# Patient Record
Sex: MALE | Race: WHITE | Employment: OTHER | URBAN - METROPOLITAN AREA
[De-identification: names, ages, dates, MRNs, and addresses within clinical notes are randomized per-mention and may not be internally consistent; named-entity substitution may affect disease eponyms.]

---

## 2017-01-25 PROCEDURE — 82088 ASSAY OF ALDOSTERONE: CPT | Performed by: INTERNAL MEDICINE

## 2017-01-25 PROCEDURE — 84244 ASSAY OF RENIN: CPT | Performed by: INTERNAL MEDICINE

## 2017-01-25 PROCEDURE — 83735 ASSAY OF MAGNESIUM: CPT | Performed by: INTERNAL MEDICINE

## 2017-01-25 PROCEDURE — 82533 TOTAL CORTISOL: CPT | Performed by: INTERNAL MEDICINE

## 2017-01-25 PROCEDURE — 36415 COLL VENOUS BLD VENIPUNCTURE: CPT | Performed by: INTERNAL MEDICINE

## 2017-01-25 PROCEDURE — 80048 BASIC METABOLIC PNL TOTAL CA: CPT | Performed by: INTERNAL MEDICINE

## 2017-01-26 PROCEDURE — 80048 BASIC METABOLIC PNL TOTAL CA: CPT | Performed by: INTERNAL MEDICINE

## 2017-01-26 PROCEDURE — 36415 COLL VENOUS BLD VENIPUNCTURE: CPT | Performed by: INTERNAL MEDICINE

## 2017-01-27 PROCEDURE — 36415 COLL VENOUS BLD VENIPUNCTURE: CPT | Performed by: FAMILY MEDICINE

## 2017-01-27 PROCEDURE — 80048 BASIC METABOLIC PNL TOTAL CA: CPT | Performed by: FAMILY MEDICINE

## 2017-01-28 ENCOUNTER — LAB ENCOUNTER (OUTPATIENT)
Dept: LAB | Age: 72
End: 2017-01-28
Payer: MEDICARE

## 2017-01-28 DIAGNOSIS — E26.9 HYPERALDOSTERONISM, UNSPECIFIED (HCC): Primary | ICD-10-CM

## 2017-01-28 LAB
BUN BLD-MCNC: 18 MG/DL (ref 8–20)
CALCIUM BLD-MCNC: 8.6 MG/DL (ref 8.3–10.3)
CHLORIDE: 105 MMOL/L (ref 101–111)
CO2: 29 MMOL/L (ref 22–32)
CREAT BLD-MCNC: 0.9 MG/DL (ref 0.7–1.3)
GLUCOSE BLD-MCNC: 99 MG/DL (ref 70–99)
POTASSIUM SERPL-SCNC: 3.9 MMOL/L (ref 3.6–5.1)
SODIUM SERPL-SCNC: 140 MMOL/L (ref 136–144)

## 2017-01-28 PROCEDURE — 80048 BASIC METABOLIC PNL TOTAL CA: CPT

## 2017-01-29 ENCOUNTER — APPOINTMENT (OUTPATIENT)
Dept: LAB | Facility: HOSPITAL | Age: 72
End: 2017-01-29
Attending: INTERNAL MEDICINE
Payer: MEDICARE

## 2017-01-29 DIAGNOSIS — E26.9 HYPERALDOSTERONISM (HCC): ICD-10-CM

## 2017-01-29 LAB
CREAT UR-SCNC: 2.21 G/24 HR (ref 0.95–2.49)
SODIUM SERPL-SCNC: 259 MEQ/24HR (ref 40–220)
SPECIMEN VOL UR: 1600 ML
SPECIMEN VOL UR: 1600 ML

## 2017-01-29 PROCEDURE — 82570 ASSAY OF URINE CREATININE: CPT

## 2017-01-29 PROCEDURE — 84300 ASSAY OF URINE SODIUM: CPT

## 2017-01-29 PROCEDURE — 82088 ASSAY OF ALDOSTERONE: CPT

## 2017-02-01 LAB
ALDOSTERONE, URINE: 10.5 UG/D
CREATININE, URINE - PER 24H: 2016 MG/D
CREATININE, URINE - PER VOLUME: 126 MG/DL
HOURS COLLECTED: 24 HR
TOTAL VOLUME: 1600 ML

## 2017-03-22 PROBLEM — I26.99 OTHER PULMONARY EMBOLISM WITHOUT ACUTE COR PULMONALE, UNSPECIFIED CHRONICITY (HCC): Status: ACTIVE | Noted: 2017-03-22

## 2017-04-14 PROCEDURE — 82436 ASSAY OF URINE CHLORIDE: CPT | Performed by: INTERNAL MEDICINE

## 2017-04-14 PROCEDURE — 84133 ASSAY OF URINE POTASSIUM: CPT | Performed by: INTERNAL MEDICINE

## 2017-04-14 PROCEDURE — 83930 ASSAY OF BLOOD OSMOLALITY: CPT | Performed by: INTERNAL MEDICINE

## 2017-04-14 PROCEDURE — 83935 ASSAY OF URINE OSMOLALITY: CPT | Performed by: INTERNAL MEDICINE

## 2017-04-14 PROCEDURE — 84300 ASSAY OF URINE SODIUM: CPT | Performed by: INTERNAL MEDICINE

## 2017-04-14 PROCEDURE — 82570 ASSAY OF URINE CREATININE: CPT | Performed by: INTERNAL MEDICINE

## 2017-05-31 PROCEDURE — 81001 URINALYSIS AUTO W/SCOPE: CPT | Performed by: INTERNAL MEDICINE

## 2017-05-31 PROCEDURE — 87086 URINE CULTURE/COLONY COUNT: CPT | Performed by: INTERNAL MEDICINE

## 2017-10-23 PROBLEM — I25.10 CORONARY ARTERY DISEASE INVOLVING NATIVE CORONARY ARTERY OF NATIVE HEART WITHOUT ANGINA PECTORIS: Status: ACTIVE | Noted: 2017-10-23

## 2017-10-23 PROCEDURE — 86803 HEPATITIS C AB TEST: CPT | Performed by: INTERNAL MEDICINE

## 2017-11-25 ENCOUNTER — HOSPITAL ENCOUNTER (OUTPATIENT)
Age: 72
Discharge: HOME | End: 2017-11-25
Attending: FAMILY MEDICINE
Payer: MEDICARE

## 2017-11-25 VITALS
OXYGEN SATURATION: 97 % | DIASTOLIC BLOOD PRESSURE: 61 MMHG | HEART RATE: 63 BPM | SYSTOLIC BLOOD PRESSURE: 112 MMHG | TEMPERATURE: 98 F | RESPIRATION RATE: 16 BRPM

## 2017-11-25 DIAGNOSIS — R04.0 BLEEDING NOSE: Primary | ICD-10-CM

## 2017-11-25 DIAGNOSIS — T14.8XXA BLEEDING FROM WOUND: ICD-10-CM

## 2017-11-25 PROCEDURE — 99213 OFFICE O/P EST LOW 20 MIN: CPT

## 2017-11-25 PROCEDURE — 30901 CONTROL OF NOSEBLEED: CPT

## 2017-11-25 NOTE — ED INITIAL ASSESSMENT (HPI)
Pt stated his left nostril began bleeding last night aroubnd 9 pm, he put his cpap machine on and had no more bleeding. He shaved this am, and then after his shower notede both sides bleeding.   There is a small cut in the middle of his nose where it meets

## 2017-11-25 NOTE — ED NOTES
Tea bag unsuccessful, bleeding continues, Md attempted silver nitrate sy=tick without success,  Gel foam applied with mild pressure, after 15 minutes, no bleeding or oozing noted, and steri strip applied to keep in place.  MD in to re evaluate

## 2017-11-26 NOTE — ED PROVIDER NOTES
Patient Seen in: [facility]    History   Patient presents with:  Nose Bleed (nasopharyngeal)    Stated Complaint: NOSE BLEED    HPI    81year old male presents for nose bleed.  Patient states his left nostril began bleeding last removal   No date: TONSILLECTOMY    Family history reviewed and is not pertinent to presenting problem.     Smoking status: Former Smoker                                                              Packs/day: 0.00      Years: 10.00        Types: Cigars bleeding applying pressure. He was not therapeutic with INR. Tea bag was applied for 20 minutes with no success. Silver nitrate was applied but still not able to control bleeding. Then gel foam was applied and monitor for few minutes.  Bleeding was controll

## 2018-01-02 PROCEDURE — 36415 COLL VENOUS BLD VENIPUNCTURE: CPT | Performed by: OTHER

## 2018-01-02 PROCEDURE — 82607 VITAMIN B-12: CPT | Performed by: OTHER

## 2018-01-02 PROCEDURE — 82746 ASSAY OF FOLIC ACID SERUM: CPT | Performed by: OTHER

## 2018-01-12 PROBLEM — R29.6 REPEATED FALLS: Status: ACTIVE | Noted: 2018-01-12

## 2018-01-12 PROBLEM — R42 DIZZINESS: Status: ACTIVE | Noted: 2018-01-12

## 2018-01-19 PROBLEM — R60.0 LOCALIZED EDEMA: Status: ACTIVE | Noted: 2018-01-19

## 2018-06-27 ENCOUNTER — WALK IN (OUTPATIENT)
Dept: URGENT CARE | Age: 73
End: 2018-06-27

## 2018-06-27 VITALS
BODY MASS INDEX: 33.13 KG/M2 | RESPIRATION RATE: 18 BRPM | OXYGEN SATURATION: 98 % | TEMPERATURE: 98.6 F | SYSTOLIC BLOOD PRESSURE: 130 MMHG | HEART RATE: 72 BPM | DIASTOLIC BLOOD PRESSURE: 78 MMHG | WEIGHT: 258 LBS

## 2018-06-27 DIAGNOSIS — M62.838 MUSCLE SPASMS OF NECK: Primary | ICD-10-CM

## 2018-06-27 PROCEDURE — 99214 OFFICE O/P EST MOD 30 MIN: CPT | Performed by: FAMILY MEDICINE

## 2018-06-27 RX ORDER — CYCLOBENZAPRINE HCL 10 MG
10 TABLET ORAL NIGHTLY
Qty: 10 TABLET | Refills: 0 | Status: SHIPPED | OUTPATIENT
Start: 2018-06-27

## 2018-06-27 ASSESSMENT — ENCOUNTER SYMPTOMS
ANOREXIA: 0
NUMBNESS: 0
ABDOMINAL PAIN: 0
COUGH: 0
GASTROINTESTINAL NEGATIVE: 1
HEADACHES: 0
WEAKNESS: 0
VISUAL CHANGE: 0
CHILLS: 0
CHANGE IN BOWEL HABIT: 0
RESPIRATORY NEGATIVE: 1
CONSTITUTIONAL NEGATIVE: 1
FATIGUE: 0
VERTIGO: 0
SORE THROAT: 0
FEVER: 0
VOMITING: 0
NAUSEA: 0
DIAPHORESIS: 0

## 2018-08-17 ENCOUNTER — TELEPHONE (OUTPATIENT)
Dept: HEMATOLOGY/ONCOLOGY | Facility: HOSPITAL | Age: 73
End: 2018-08-17

## 2019-01-07 ENCOUNTER — APPOINTMENT (OUTPATIENT)
Dept: GENERAL RADIOLOGY | Facility: HOSPITAL | Age: 74
End: 2019-01-07
Attending: EMERGENCY MEDICINE
Payer: MEDICARE

## 2019-01-07 ENCOUNTER — HOSPITAL ENCOUNTER (OUTPATIENT)
Facility: HOSPITAL | Age: 74
Setting detail: OBSERVATION
LOS: 1 days | Discharge: HOME | End: 2019-01-08
Attending: EMERGENCY MEDICINE | Admitting: INTERNAL MEDICINE
Payer: MEDICARE

## 2019-01-07 DIAGNOSIS — R07.9 CHEST PAIN OF UNCERTAIN ETIOLOGY: Primary | ICD-10-CM

## 2019-01-07 LAB
ALBUMIN SERPL-MCNC: 3.8 G/DL (ref 3.1–4.5)
ALBUMIN/GLOB SERPL: 1 {RATIO} (ref 1–2)
ALP LIVER SERPL-CCNC: 61 U/L (ref 45–117)
ALT SERPL-CCNC: 26 U/L (ref 17–63)
ANION GAP SERPL CALC-SCNC: 5 MMOL/L (ref 0–18)
APTT PPP: 34.5 SECONDS (ref 26.1–34.6)
AST SERPL-CCNC: 20 U/L (ref 15–41)
BASOPHILS # BLD AUTO: 0.03 X10(3) UL (ref 0–0.1)
BASOPHILS NFR BLD AUTO: 0.5 %
BILIRUB SERPL-MCNC: 0.6 MG/DL (ref 0.1–2)
BUN BLD-MCNC: 15 MG/DL (ref 8–20)
BUN/CREAT SERPL: 16.1 (ref 10–20)
CALCIUM BLD-MCNC: 8.9 MG/DL (ref 8.3–10.3)
CHLORIDE SERPL-SCNC: 103 MMOL/L (ref 101–111)
CO2 SERPL-SCNC: 31 MMOL/L (ref 22–32)
CREAT BLD-MCNC: 0.93 MG/DL (ref 0.7–1.3)
EOSINOPHIL # BLD AUTO: 0.07 X10(3) UL (ref 0–0.3)
EOSINOPHIL NFR BLD AUTO: 1.2 %
ERYTHROCYTE [DISTWIDTH] IN BLOOD BY AUTOMATED COUNT: 14 % (ref 11.5–16)
GLOBULIN PLAS-MCNC: 3.8 G/DL (ref 2.8–4.4)
GLUCOSE BLD-MCNC: 93 MG/DL (ref 70–99)
HCT VFR BLD AUTO: 46.4 % (ref 37–53)
HGB BLD-MCNC: 15.4 G/DL (ref 13–17)
IMMATURE GRANULOCYTE COUNT: 0.02 X10(3) UL (ref 0–1)
IMMATURE GRANULOCYTE RATIO %: 0.3 %
INR BLD: 1.85 (ref 0.9–1.1)
LYMPHOCYTES # BLD AUTO: 1.12 X10(3) UL (ref 0.9–4)
LYMPHOCYTES NFR BLD AUTO: 19.5 %
M PROTEIN MFR SERPL ELPH: 7.6 G/DL (ref 6.4–8.2)
MCH RBC QN AUTO: 28.1 PG (ref 27–33.2)
MCHC RBC AUTO-ENTMCNC: 33.2 G/DL (ref 31–37)
MCV RBC AUTO: 84.5 FL (ref 80–99)
MONOCYTES # BLD AUTO: 0.65 X10(3) UL (ref 0.1–1)
MONOCYTES NFR BLD AUTO: 11.3 %
NEUTROPHIL ABS PRELIM: 3.86 X10 (3) UL (ref 1.3–6.7)
NEUTROPHILS # BLD AUTO: 3.86 X10(3) UL (ref 1.3–6.7)
NEUTROPHILS NFR BLD AUTO: 67.2 %
OSMOLALITY SERPL CALC.SUM OF ELEC: 289 MOSM/KG (ref 275–295)
PLATELET # BLD AUTO: 169 10(3)UL (ref 150–450)
POTASSIUM SERPL-SCNC: 3.7 MMOL/L (ref 3.6–5.1)
PSA SERPL DL<=0.01 NG/ML-MCNC: 22 SECONDS (ref 12.4–14.7)
RBC # BLD AUTO: 5.49 X10(6)UL (ref 3.8–5.8)
RED CELL DISTRIBUTION WIDTH-SD: 42.8 FL (ref 35.1–46.3)
SODIUM SERPL-SCNC: 139 MMOL/L (ref 136–144)
TROPONIN I SERPL-MCNC: <0.046 NG/ML (ref ?–0.05)
WBC # BLD AUTO: 5.8 X10(3) UL (ref 4–13)

## 2019-01-07 PROCEDURE — 84484 ASSAY OF TROPONIN QUANT: CPT | Performed by: INTERNAL MEDICINE

## 2019-01-07 PROCEDURE — 85610 PROTHROMBIN TIME: CPT | Performed by: EMERGENCY MEDICINE

## 2019-01-07 PROCEDURE — 71045 X-RAY EXAM CHEST 1 VIEW: CPT | Performed by: EMERGENCY MEDICINE

## 2019-01-07 PROCEDURE — 85025 COMPLETE CBC W/AUTO DIFF WBC: CPT | Performed by: EMERGENCY MEDICINE

## 2019-01-07 PROCEDURE — 99285 EMERGENCY DEPT VISIT HI MDM: CPT | Performed by: EMERGENCY MEDICINE

## 2019-01-07 PROCEDURE — 84484 ASSAY OF TROPONIN QUANT: CPT | Performed by: EMERGENCY MEDICINE

## 2019-01-07 PROCEDURE — 93005 ELECTROCARDIOGRAM TRACING: CPT

## 2019-01-07 PROCEDURE — 80053 COMPREHEN METABOLIC PANEL: CPT | Performed by: EMERGENCY MEDICINE

## 2019-01-07 PROCEDURE — 93010 ELECTROCARDIOGRAM REPORT: CPT | Performed by: EMERGENCY MEDICINE

## 2019-01-07 PROCEDURE — 85730 THROMBOPLASTIN TIME PARTIAL: CPT | Performed by: EMERGENCY MEDICINE

## 2019-01-07 PROCEDURE — 36415 COLL VENOUS BLD VENIPUNCTURE: CPT | Performed by: EMERGENCY MEDICINE

## 2019-01-07 RX ORDER — PRAVASTATIN SODIUM 20 MG
20 TABLET ORAL NIGHTLY
Status: DISCONTINUED | OUTPATIENT
Start: 2019-01-07 | End: 2019-01-08

## 2019-01-07 RX ORDER — HYDROCHLOROTHIAZIDE 25 MG/1
25 TABLET ORAL
Status: DISCONTINUED | OUTPATIENT
Start: 2019-01-08 | End: 2019-01-08

## 2019-01-07 RX ORDER — WARFARIN SODIUM 7.5 MG/1
7.5 TABLET ORAL
Status: DISCONTINUED | OUTPATIENT
Start: 2019-01-08 | End: 2019-01-08

## 2019-01-07 RX ORDER — FAMOTIDINE 20 MG/1
20 TABLET ORAL DAILY
Status: DISCONTINUED | OUTPATIENT
Start: 2019-01-08 | End: 2019-01-08

## 2019-01-07 RX ORDER — ALPRAZOLAM 0.25 MG/1
0.25 TABLET ORAL DAILY PRN
Status: DISCONTINUED | OUTPATIENT
Start: 2019-01-07 | End: 2019-01-08

## 2019-01-07 RX ORDER — METOPROLOL SUCCINATE 25 MG/1
25 TABLET, EXTENDED RELEASE ORAL
Status: DISCONTINUED | OUTPATIENT
Start: 2019-01-08 | End: 2019-01-08

## 2019-01-07 RX ORDER — MORPHINE SULFATE 4 MG/ML
4 INJECTION, SOLUTION INTRAMUSCULAR; INTRAVENOUS EVERY 2 HOUR PRN
Status: DISCONTINUED | OUTPATIENT
Start: 2019-01-07 | End: 2019-01-08

## 2019-01-07 RX ORDER — WARFARIN SODIUM 7.5 MG/1
7.5 TABLET ORAL NIGHTLY
Status: DISCONTINUED | OUTPATIENT
Start: 2019-01-07 | End: 2019-01-07 | Stop reason: DRUGHIGH

## 2019-01-07 RX ORDER — LEVOTHYROXINE SODIUM 0.1 MG/1
100 TABLET ORAL
Status: DISCONTINUED | OUTPATIENT
Start: 2019-01-08 | End: 2019-01-08

## 2019-01-07 RX ORDER — MORPHINE SULFATE 4 MG/ML
1 INJECTION, SOLUTION INTRAMUSCULAR; INTRAVENOUS EVERY 2 HOUR PRN
Status: DISCONTINUED | OUTPATIENT
Start: 2019-01-07 | End: 2019-01-08

## 2019-01-07 RX ORDER — NITROGLYCERIN 0.4 MG/1
0.4 TABLET SUBLINGUAL EVERY 5 MIN PRN
Status: DISCONTINUED | OUTPATIENT
Start: 2019-01-07 | End: 2019-01-08

## 2019-01-07 RX ORDER — MORPHINE SULFATE 4 MG/ML
2 INJECTION, SOLUTION INTRAMUSCULAR; INTRAVENOUS EVERY 2 HOUR PRN
Status: DISCONTINUED | OUTPATIENT
Start: 2019-01-07 | End: 2019-01-08

## 2019-01-07 RX ORDER — NITROGLYCERIN 0.4 MG/1
0.4 TABLET SUBLINGUAL ONCE
Status: COMPLETED | OUTPATIENT
Start: 2019-01-07 | End: 2019-01-07

## 2019-01-07 RX ORDER — WARFARIN SODIUM 5 MG/1
5 TABLET ORAL
Status: DISCONTINUED | OUTPATIENT
Start: 2019-01-07 | End: 2019-01-08

## 2019-01-08 ENCOUNTER — APPOINTMENT (OUTPATIENT)
Dept: INTERVENTIONAL RADIOLOGY/VASCULAR | Facility: HOSPITAL | Age: 74
End: 2019-01-08
Attending: INTERNAL MEDICINE
Payer: MEDICARE

## 2019-01-08 VITALS
DIASTOLIC BLOOD PRESSURE: 92 MMHG | WEIGHT: 254 LBS | BODY MASS INDEX: 32.6 KG/M2 | HEART RATE: 74 BPM | HEIGHT: 74 IN | SYSTOLIC BLOOD PRESSURE: 128 MMHG | TEMPERATURE: 98 F | OXYGEN SATURATION: 96 % | RESPIRATION RATE: 18 BRPM

## 2019-01-08 LAB
ANION GAP SERPL CALC-SCNC: 6 MMOL/L (ref 0–18)
ATRIAL RATE: 62 BPM
BUN BLD-MCNC: 15 MG/DL (ref 8–20)
BUN/CREAT SERPL: 17.2 (ref 10–20)
CALCIUM BLD-MCNC: 8.6 MG/DL (ref 8.3–10.3)
CHLORIDE SERPL-SCNC: 105 MMOL/L (ref 101–111)
CO2 SERPL-SCNC: 28 MMOL/L (ref 22–32)
CREAT BLD-MCNC: 0.87 MG/DL (ref 0.7–1.3)
ERYTHROCYTE [DISTWIDTH] IN BLOOD BY AUTOMATED COUNT: 14.2 % (ref 11.5–16)
GLUCOSE BLD-MCNC: 107 MG/DL (ref 70–99)
GLUCOSE BLD-MCNC: 95 MG/DL (ref 65–99)
HCT VFR BLD AUTO: 45.8 % (ref 37–53)
HGB BLD-MCNC: 14.9 G/DL (ref 13–17)
INR BLD: 1.86 (ref 0.9–1.1)
MCH RBC QN AUTO: 28.3 PG (ref 27–33.2)
MCHC RBC AUTO-ENTMCNC: 32.5 G/DL (ref 31–37)
MCV RBC AUTO: 87.1 FL (ref 80–99)
OSMOLALITY SERPL CALC.SUM OF ELEC: 289 MOSM/KG (ref 275–295)
P AXIS: 40 DEGREES
P-R INTERVAL: 234 MS
PLATELET # BLD AUTO: 148 10(3)UL (ref 150–450)
POTASSIUM SERPL-SCNC: 3.7 MMOL/L (ref 3.6–5.1)
PSA SERPL DL<=0.01 NG/ML-MCNC: 22.1 SECONDS (ref 12.4–14.7)
Q-T INTERVAL: 496 MS
QRS DURATION: 166 MS
QTC CALCULATION (BEZET): 503 MS
R AXIS: 80 DEGREES
RBC # BLD AUTO: 5.26 X10(6)UL (ref 3.8–5.8)
RED CELL DISTRIBUTION WIDTH-SD: 44.9 FL (ref 35.1–46.3)
SODIUM SERPL-SCNC: 139 MMOL/L (ref 136–144)
T AXIS: 65 DEGREES
VENTRICULAR RATE: 62 BPM
WBC # BLD AUTO: 5 X10(3) UL (ref 4–13)

## 2019-01-08 PROCEDURE — 99153 MOD SED SAME PHYS/QHP EA: CPT

## 2019-01-08 PROCEDURE — B2121ZZ FLUOROSCOPY OF SINGLE CORONARY ARTERY BYPASS GRAFT USING LOW OSMOLAR CONTRAST: ICD-10-PCS | Performed by: INTERNAL MEDICINE

## 2019-01-08 PROCEDURE — 85610 PROTHROMBIN TIME: CPT | Performed by: INTERNAL MEDICINE

## 2019-01-08 PROCEDURE — 82962 GLUCOSE BLOOD TEST: CPT

## 2019-01-08 PROCEDURE — B2181ZZ FLUOROSCOPY OF LEFT INTERNAL MAMMARY BYPASS GRAFT USING LOW OSMOLAR CONTRAST: ICD-10-PCS | Performed by: INTERNAL MEDICINE

## 2019-01-08 PROCEDURE — 99152 MOD SED SAME PHYS/QHP 5/>YRS: CPT

## 2019-01-08 PROCEDURE — 80048 BASIC METABOLIC PNL TOTAL CA: CPT | Performed by: INTERNAL MEDICINE

## 2019-01-08 PROCEDURE — 93455 CORONARY ART/GRFT ANGIO S&I: CPT

## 2019-01-08 PROCEDURE — 85027 COMPLETE CBC AUTOMATED: CPT | Performed by: INTERNAL MEDICINE

## 2019-01-08 RX ORDER — LIDOCAINE HYDROCHLORIDE 10 MG/ML
INJECTION, SOLUTION EPIDURAL; INFILTRATION; INTRACAUDAL; PERINEURAL
Status: COMPLETED
Start: 2019-01-08 | End: 2019-01-08

## 2019-01-08 RX ORDER — MIDAZOLAM HYDROCHLORIDE 1 MG/ML
INJECTION INTRAMUSCULAR; INTRAVENOUS
Status: COMPLETED
Start: 2019-01-08 | End: 2019-01-08

## 2019-01-08 RX ORDER — SODIUM CHLORIDE 9 MG/ML
INJECTION, SOLUTION INTRAVENOUS CONTINUOUS
Status: DISCONTINUED | OUTPATIENT
Start: 2019-01-08 | End: 2019-01-08

## 2019-01-08 RX ORDER — HEPARIN SODIUM 5000 [USP'U]/ML
INJECTION, SOLUTION INTRAVENOUS; SUBCUTANEOUS
Status: COMPLETED
Start: 2019-01-08 | End: 2019-01-08

## 2019-01-08 RX ORDER — ASPIRIN 81 MG/1
324 TABLET, CHEWABLE ORAL DAILY
Status: DISCONTINUED | OUTPATIENT
Start: 2019-01-08 | End: 2019-01-08

## 2019-01-08 RX ORDER — POTASSIUM CHLORIDE 20 MEQ/1
40 TABLET, EXTENDED RELEASE ORAL ONCE
Status: COMPLETED | OUTPATIENT
Start: 2019-01-08 | End: 2019-01-08

## 2019-01-08 RX ORDER — SODIUM CHLORIDE 9 MG/ML
INJECTION, SOLUTION INTRAVENOUS CONTINUOUS
Status: ACTIVE | OUTPATIENT
Start: 2019-01-08 | End: 2019-01-08

## 2019-01-08 NOTE — H&P
[facility] Hospitalist H&P       CC: Patient presents with:  Chest Pain Angina (cardiovascular)       PCP: Mike Dupree MD    History of Present Illness:  Pt is a 70y/o M with hx CAD s/p PCI and emergent CABG, HTN, HLD, a fib on coumadin, hypothyroidism who p Comment:\"Loss of psychomotor skills and vomiting\"     Home Medications:    Outpatient Medications Marked as Taking for the 1/7/19 encounter [facility] Encounter):  TWIN ASPIRIN  MG Oral Tab EC TAKE ONE TABLET BY MOUTH ONE TIME DAILY Disp: 90 tablet Rf Dementia Father         Parkinson's Disease   • Cancer Father         colon cancer diagnosed at 80   • Stroke Mother        Review of Systems  Comprehensive ROS reviewed and negative except for what's stated above.   Including negative for fevers, chills, a compared with ECG of 24-JAN-2016 04:14,  AR interval has increased      Radiology:   Xr Chest Ap Portable  (cpt=71045)    Result Date: 1/7/2019  PROCEDURE:  XR CHEST AP PORTABLE  (CPT=71045)  TECHNIQUE:  AP chest radiograph was obtained.   COMPARISON:  EDWA services that will reasonably be expected to span two midnight's based on the clinical documentation in H+P. Based on patients current state of illness, I anticipate that, after discharge, patient will require TBD.

## 2019-01-08 NOTE — CM/SW NOTE
COND 44: Patient failed Inpatient criteria. Second level of review completed and supports Observation. UR committee in agreement. Discussed with Dr Zulema De Dios approves.

## 2019-01-08 NOTE — ED INITIAL ASSESSMENT (HPI)
Patient presents with left sided chest pressure that started about 1hr PTA; hx of triple bipass 2006.

## 2019-01-08 NOTE — ED PROVIDER NOTES
Patient Seen in: [facility] Emergency Department    History   Patient presents with:  Chest Pain Angina (cardiovascular)    Stated Complaint:     LOKI    Pritesh Simms is a pleasant 81-year-old male presenting to emerge part for chest pain.   He states that     right index finger sepsis s/p wart removal    • TONSILLECTOMY             Social History    Tobacco Use      Smoking status: Former Smoker        Years: 10.00        Types: Cigars        Quit date: 1977        Years since quittin.5      S COMP METABOLIC PANEL (14) - Normal   TROPONIN I - Normal   PTT, ACTIVATED - Normal   CBC WITH DIFFERENTIAL WITH PLATELET    Narrative: The following orders were created for panel order CBC WITH DIFFERENTIAL WITH PLATELET.   Procedure

## 2019-01-08 NOTE — CONSULTS
[facility]  Report of Consultation    Maria Teresa Guzman Patient Status:  Observation    1945 MRN [id number]   [facility] 2NE-A Attending Dyan Noriega MD   Hosp Day # 0 PCP Adi Serra MD     Reason for Consultation:  C that retrograde filled the LAD up to the occlusion, but a LIMA was not utilized. Last noc sitting in bed reading when he dev left chest pain like \"a little anvil on my chest, reminiscent of my prior MI\". No SOB, diaphoresis, nausea.   Sx persisted, ca 2006    [place], [place].  LIMA to LAD, SVG to D1, SVG to D2   • CABG     • COLONOSCOPY  2015    Dr. Paresh Blue, at [facility], polyps otherwise negative.     • OTHER SURGICAL HISTORY  2009    right index finger sepsis s/p wart removal    • TONSILLEC (36.8 °C)] 97.6 °F (36.4 °C)  Pulse:  [56-72] 64  Resp:  [13-21] 18  BP: (119-158)/(70-98) 131/81    Temp (24hrs), Av °F (36.7 °C), Min:97.6 °F (36.4 °C), Max:98.3 °F (36.8 °C)        General: Alert and oriented in no apparent distress.   HEENT: No foca

## 2019-01-08 NOTE — PROCEDURES
[facility]  Angiogram Procedure Note    Brent Hamilton Location: Cath Lab    CSN [id number] MRN [id number]   Admission Date 1/7/2019 Procedure Date 1/8/2019   Attending Physician Jasmin Salinas MD Procedure Physician Cristiana Babin MD     Pre-P Distally, the LAD has competitive flow. There was appoximately 25 mm of mid % instent restenosis. The D2 had mild diffuse luminal irregularities.     Right Coronary Artery: Selective injection into the RCA using the JR4 catheter showed a dominant v

## 2019-01-08 NOTE — PLAN OF CARE
CARDIOVASCULAR - ADULT    • Maintains optimal cardiac output and hemodynamic stability Progressing    • Absence of cardiac arrhythmias or at baseline Progressing        AOx4, 2/10 pain from headache from sublingual nitro,pt states chest pressure much less

## 2019-01-09 NOTE — PLAN OF CARE
D: Patient received orders for discharge    A: Reviewed post cath instructions, handout given; reviewed discharge instructions, appointments to be made; reviewed discharge medications, no new prescriptions, patient given a copy of INR result for coumadin d

## 2019-04-27 ENCOUNTER — APPOINTMENT (OUTPATIENT)
Dept: CT IMAGING | Facility: HOSPITAL | Age: 74
End: 2019-04-27
Attending: EMERGENCY MEDICINE
Payer: MEDICARE

## 2019-04-27 ENCOUNTER — HOSPITAL ENCOUNTER (EMERGENCY)
Facility: HOSPITAL | Age: 74
Discharge: HOME | End: 2019-04-27
Attending: EMERGENCY MEDICINE
Payer: MEDICARE

## 2019-04-27 ENCOUNTER — APPOINTMENT (OUTPATIENT)
Dept: MRI IMAGING | Facility: HOSPITAL | Age: 74
End: 2019-04-27
Attending: EMERGENCY MEDICINE
Payer: MEDICARE

## 2019-04-27 VITALS
HEART RATE: 72 BPM | RESPIRATION RATE: 14 BRPM | WEIGHT: 250 LBS | OXYGEN SATURATION: 95 % | BODY MASS INDEX: 31.08 KG/M2 | SYSTOLIC BLOOD PRESSURE: 147 MMHG | DIASTOLIC BLOOD PRESSURE: 100 MMHG | TEMPERATURE: 98 F | HEIGHT: 75 IN

## 2019-04-27 DIAGNOSIS — H81.10 BENIGN PAROXYSMAL POSITIONAL VERTIGO, UNSPECIFIED LATERALITY: Primary | ICD-10-CM

## 2019-04-27 PROCEDURE — 36415 COLL VENOUS BLD VENIPUNCTURE: CPT

## 2019-04-27 PROCEDURE — 70553 MRI BRAIN STEM W/O & W/DYE: CPT | Performed by: EMERGENCY MEDICINE

## 2019-04-27 PROCEDURE — 70450 CT HEAD/BRAIN W/O DYE: CPT | Performed by: EMERGENCY MEDICINE

## 2019-04-27 PROCEDURE — 70546 MR ANGIOGRAPH HEAD W/O&W/DYE: CPT | Performed by: EMERGENCY MEDICINE

## 2019-04-27 PROCEDURE — 93010 ELECTROCARDIOGRAM REPORT: CPT

## 2019-04-27 PROCEDURE — 70549 MR ANGIOGRAPH NECK W/O&W/DYE: CPT | Performed by: EMERGENCY MEDICINE

## 2019-04-27 PROCEDURE — 93005 ELECTROCARDIOGRAM TRACING: CPT

## 2019-04-27 PROCEDURE — 85025 COMPLETE CBC W/AUTO DIFF WBC: CPT | Performed by: EMERGENCY MEDICINE

## 2019-04-27 PROCEDURE — 80053 COMPREHEN METABOLIC PANEL: CPT | Performed by: EMERGENCY MEDICINE

## 2019-04-27 PROCEDURE — 99285 EMERGENCY DEPT VISIT HI MDM: CPT

## 2019-04-27 PROCEDURE — 85610 PROTHROMBIN TIME: CPT | Performed by: EMERGENCY MEDICINE

## 2019-04-27 PROCEDURE — 85730 THROMBOPLASTIN TIME PARTIAL: CPT | Performed by: EMERGENCY MEDICINE

## 2019-04-27 PROCEDURE — A9575 INJ GADOTERATE MEGLUMI 0.1ML: HCPCS | Performed by: EMERGENCY MEDICINE

## 2019-04-27 RX ORDER — TRAMADOL HYDROCHLORIDE 50 MG/1
50 TABLET ORAL ONCE
Status: COMPLETED | OUTPATIENT
Start: 2019-04-27 | End: 2019-04-27

## 2019-04-27 RX ORDER — MECLIZINE HYDROCHLORIDE 25 MG/1
25 TABLET ORAL 3 TIMES DAILY PRN
Qty: 15 TABLET | Refills: 0 | Status: SHIPPED | OUTPATIENT
Start: 2019-04-27 | End: 2021-11-30

## 2019-04-27 RX ORDER — MECLIZINE HYDROCHLORIDE 25 MG/1
25 TABLET ORAL ONCE
Status: COMPLETED | OUTPATIENT
Start: 2019-04-27 | End: 2019-04-27

## 2019-04-27 NOTE — ED NOTES
Pt states feeling better with the dizziness, states still has dizziness with positional changes but is improved.

## 2019-04-27 NOTE — ED PROVIDER NOTES
Patient Seen in: [facility] Emergency Department    History   Patient presents with:  Dizziness (neurologic)    Stated Complaint: dizzy    HPI    The patient is a 81-year-old male who presents emergency room with a history of sudden onset of dizzine APPENDECTOMY     • BYPASS SURGERY  2006    [place], [place].  LIMA to LAD, SVG to D1, SVG to D2   • CABG     • COLONOSCOPY  2015    Dr. Elaina Campbell, at [facility], polyps otherwise negative.     • OTHER SURGICAL HISTORY  2009    right index finger sep to palpation appreciated anywhere throughout the abdomen. There is no guarding, no rebound, no mass, and no organomegaly appreciated. There is normoactive bowel sounds. There is no hernia. EXTREMITIES: There is no cyanosis, clubbing, or edema appreciated. acute ST elevation appreciated. Ct Brain Or Head (06829)    Result Date: 4/27/2019  CONCLUSION:  Mild diffuse cerebral and cerebellar atrophy. No acute intracranial hemorrhage, mass effect or midline shift.    Dictated by: Daphney Fong neuro interventionalists regarding the possible 2 mm aneurysm versus artifact as per radiologist.  Patient has no evidence of any acute stroke. Patient be discharged home at this time.     Patient had an IV line established and blood work drawn including a MD  [address]  [phone]    Call in 2 days  please call for follow up this week    Giancarlo Maynard MD  [address] [phone]    Call in 2 days  please call monday for follow up with

## 2019-04-28 NOTE — ED NOTES
PT return from MRI at this time, complaining of severe back spasms. Patient states his back \"locked up\" from laying on MRI table for too long. MD aware. No new orders at this time.

## 2019-04-28 NOTE — ED NOTES
Pt's wife to nurse's station questioning \"where are the pain meds? \" and stating \"he is in severe pain he can't take it anymore. \" MD to bedside.

## 2019-05-08 ENCOUNTER — OFFICE VISIT (OUTPATIENT)
Dept: SURGERY | Facility: CLINIC | Age: 74
End: 2019-05-08
Payer: MEDICARE

## 2019-05-08 VITALS
SYSTOLIC BLOOD PRESSURE: 136 MMHG | HEIGHT: 74 IN | BODY MASS INDEX: 32.08 KG/M2 | DIASTOLIC BLOOD PRESSURE: 80 MMHG | WEIGHT: 250 LBS | HEART RATE: 72 BPM

## 2019-05-08 DIAGNOSIS — I67.1 INTRACRANIAL ANEURYSM: Primary | ICD-10-CM

## 2019-05-08 PROCEDURE — 99203 OFFICE O/P NEW LOW 30 MIN: CPT | Performed by: RADIOLOGY

## 2019-05-08 NOTE — PROGRESS NOTES
New Pt is here for Aneurysm. MRI and CT of the brain obtained.      Review of Systems:    Hand Dominance: right  General: fatigue  Neuro: memory loss  Head: no symptoms reported  Musculoskeletal: no symptoms reported  Cardiovascular: no symptoms reported  G

## 2019-05-08 NOTE — PROGRESS NOTES
Referred for equivocal finding of tiny distal L PICA aneurysm on MRA performed after presenting with dizziness, vertigo, imbalance and jerky vision diagnosed as a viral labyrinthitis with unremarkable MR brain and full resolution of symptoms on meclezine a 180 tablet Rfl: 1   LEVOTHYROXINE SODIUM 100 MCG Oral Tab TAKE 1 TABLET BY MOUTH DAILY Disp: 90 tablet Rfl: 0   SM ASPIRIN  MG Oral Tab EC TAKE ONE TABLET BY MOUTH ONE TIME DAILY Disp: 90 tablet Rfl: 3   RANITIDINE  MG Oral Tab TAKE ONE TABLET Cigars        Quit date: 1977        Years since quittin.9      Smokeless tobacco: Never Used    Substance and Sexual Activity      Alcohol use: Yes        Alcohol/week: 0.6 - 1.8 oz        Types: 1 - 3 Glasses of wine per week      Drug use:  No

## 2019-05-14 ENCOUNTER — HOSPITAL ENCOUNTER (OUTPATIENT)
Dept: CT IMAGING | Facility: HOSPITAL | Age: 74
Discharge: HOME | End: 2019-05-14
Attending: RADIOLOGY
Payer: MEDICARE

## 2019-05-14 DIAGNOSIS — I67.1 INTRACRANIAL ANEURYSM: ICD-10-CM

## 2019-05-14 PROCEDURE — 70496 CT ANGIOGRAPHY HEAD: CPT | Performed by: RADIOLOGY

## 2019-05-14 NOTE — TREATMENT PLAN
I reviewed the cta HEAD FROM 5/14/19 - No change in tiny 1-2mm prominence og L PICA distal segment in 4th ventricle. Plan to repeat an MRA head in 3 months to monitor.

## 2019-05-23 ENCOUNTER — TELEPHONE (OUTPATIENT)
Dept: SURGERY | Facility: CLINIC | Age: 74
End: 2019-05-23

## 2019-05-23 NOTE — TELEPHONE ENCOUNTER
Per  protocol patient's need to follow up in the office to review test results and discuss treatment plan. Will have  reach out to patient to schedule a follow up appointment.

## 2019-05-28 ENCOUNTER — OFFICE VISIT (OUTPATIENT)
Dept: SURGERY | Facility: CLINIC | Age: 74
End: 2019-05-28
Payer: MEDICARE

## 2019-05-28 ENCOUNTER — TELEPHONE (OUTPATIENT)
Dept: SURGERY | Facility: CLINIC | Age: 74
End: 2019-05-28

## 2019-05-28 VITALS — DIASTOLIC BLOOD PRESSURE: 80 MMHG | SYSTOLIC BLOOD PRESSURE: 142 MMHG | HEART RATE: 72 BPM

## 2019-05-28 DIAGNOSIS — R93.89 ABNORMAL FINDING OF DIAGNOSTIC IMAGING: Primary | ICD-10-CM

## 2019-05-28 DIAGNOSIS — I67.1 INTRACRANIAL ANEURYSM: ICD-10-CM

## 2019-05-28 PROCEDURE — 99213 OFFICE O/P EST LOW 20 MIN: CPT | Performed by: RADIOLOGY

## 2019-05-28 NOTE — PROGRESS NOTES
Pt is here for follow up for CTA of the brain.      Review of Systems:    Hand Dominance: right  General: fatigue  Neuro: memory loss  Head: no symptoms reported  Musculoskeletal: no symptoms reported  Cardiovascular: no symptoms reported  Gastrointestinal:

## 2019-05-28 NOTE — TELEPHONE ENCOUNTER
Patient of Junious Moisés due for f/u MRA and clinic visit in 3 months (8/2019.)    Patient reminder message placed.

## 2019-05-28 NOTE — PROGRESS NOTES
Here to review CTA head done on 5/14/2019 compared to MRA head 4/27/2019. The <1mm possible aneurysm located on a L PICA loop in the 4th ventricle is even less evident on the CTA probably due to technique.  The patient has no headaches and his dizziness has

## 2019-06-18 ENCOUNTER — TELEPHONE (OUTPATIENT)
Dept: SCHEDULING | Age: 74
End: 2019-06-18

## 2019-07-15 ENCOUNTER — TELEPHONE (OUTPATIENT)
Dept: SURGERY | Facility: CLINIC | Age: 74
End: 2019-07-15

## 2019-07-15 NOTE — TELEPHONE ENCOUNTER
Patient currently scheduled for MRI 8/12/19. Patient will need to schedule f/u appointment with  to review test results.

## 2019-08-14 ENCOUNTER — OFFICE VISIT (OUTPATIENT)
Dept: SURGERY | Facility: CLINIC | Age: 74
End: 2019-08-14
Payer: MEDICARE

## 2019-08-14 VITALS — SYSTOLIC BLOOD PRESSURE: 112 MMHG | DIASTOLIC BLOOD PRESSURE: 68 MMHG | HEART RATE: 64 BPM | RESPIRATION RATE: 18 BRPM

## 2019-08-14 DIAGNOSIS — I67.1 INTRACRANIAL ANEURYSM: Primary | ICD-10-CM

## 2019-08-14 PROCEDURE — 99213 OFFICE O/P EST LOW 20 MIN: CPT | Performed by: PHYSICIAN ASSISTANT

## 2019-08-14 NOTE — PROGRESS NOTES
Patient here to review MRI.     Review of Systems:    Hand Dominance: right  General: no symptoms reported  Neuro: memory loss  Head: no symptoms reported  Musculoskeletal: no symptoms reported  Cardiovascular: no symptoms reported  Gastrointestinal: no sym

## 2019-08-14 NOTE — PROGRESS NOTES
[address] Patient Status:  No patient class for patient encounter    1945 MRN [id number]   Location ED [facility], [address], [address] Attending No att. providers found   [address] Day # 0 PCP Madan Estes 1990s   • APPENDECTOMY     • BYPASS SURGERY  2006    [place], [place].  LIMA to LAD, SVG to D1, SVG to D2   • CABG     • COLONOSCOPY  2015    Dr. Pierre Garibay, at [facility], polyps otherwise negative.     • OTHER SURGICAL HISTORY  2009    right index tablet, Rfl: 3  •  RANITIDINE  MG Oral Tab, TAKE ONE TABLET BY MOUTH ONE TIME DAILY, Disp: 90 tablet, Rfl: 3  •  HYDROCHLOROTHIAZIDE 25 MG Oral Tab, TAKE ONE TABLET BY MOUTH ONE TIME DAILY, Disp: 90 tablet, Rfl: 3  •  Sertraline HCl 100 MG Oral Tab, reviewed, per Dr. Ren Persons \"equivocal 1-2mm aneurysmal bulge L PICA in 4th ventricle. If a true aneurysm then probably has a minimal risk over patient's lifetime but would nee to follow with non invasive surveillance. \"  Plan for repeat MRA in 9 months foll

## 2019-08-14 NOTE — PATIENT INSTRUCTIONS
Refill policies:    • Allow 2-3 business days for refills; controlled substances may take longer.   • Contact your pharmacy at least 5 days prior to running out of medication and have them send an electronic request or submit request through the “request re Depending on your insurance carrier, approval may take 3-10 days. It is highly recommended patients contact their insurance carrier directly to determine coverage.   If test is done without insurance authorization, patient may be responsible for the entire sudden/severe headache with nausea/vomiting.

## 2020-05-19 ENCOUNTER — HOSPITAL ENCOUNTER (EMERGENCY)
Age: 75
Discharge: HOME | End: 2020-05-19
Attending: EMERGENCY MEDICINE
Payer: MEDICARE

## 2020-05-19 VITALS
SYSTOLIC BLOOD PRESSURE: 142 MMHG | HEART RATE: 68 BPM | OXYGEN SATURATION: 97 % | BODY MASS INDEX: 23 KG/M2 | TEMPERATURE: 98 F | HEIGHT: 75 IN | DIASTOLIC BLOOD PRESSURE: 90 MMHG | WEIGHT: 185 LBS | RESPIRATION RATE: 16 BRPM

## 2020-05-19 DIAGNOSIS — S61.412A SUPERFICIAL LACERATION OF LEFT HAND, INITIAL ENCOUNTER: Primary | ICD-10-CM

## 2020-05-19 PROCEDURE — 99283 EMERGENCY DEPT VISIT LOW MDM: CPT

## 2020-05-19 PROCEDURE — 36415 COLL VENOUS BLD VENIPUNCTURE: CPT

## 2020-05-19 PROCEDURE — 12001 RPR S/N/AX/GEN/TRNK 2.5CM/<: CPT

## 2020-05-19 PROCEDURE — 85610 PROTHROMBIN TIME: CPT | Performed by: EMERGENCY MEDICINE

## 2020-05-19 NOTE — ED INITIAL ASSESSMENT (HPI)
Pt was playing ball with her daughter and his nail cut his finger left 5th digit, unable to control bleeding.  On coumadin

## 2020-07-16 PROBLEM — N40.1 BPH WITH OBSTRUCTION/LOWER URINARY TRACT SYMPTOMS: Status: ACTIVE | Noted: 2020-07-16

## 2020-07-16 PROBLEM — N13.8 BPH WITH OBSTRUCTION/LOWER URINARY TRACT SYMPTOMS: Status: ACTIVE | Noted: 2020-07-16

## 2020-07-16 PROBLEM — R31.0 GROSS HEMATURIA: Status: ACTIVE | Noted: 2020-07-16

## 2020-08-10 ENCOUNTER — HOSPITAL ENCOUNTER (OUTPATIENT)
Dept: MRI IMAGING | Facility: HOSPITAL | Age: 75
Discharge: HOME | End: 2020-08-10
Attending: PHYSICIAN ASSISTANT
Payer: MEDICARE

## 2020-08-10 DIAGNOSIS — I67.1 INTRACRANIAL ANEURYSM: ICD-10-CM

## 2020-08-10 PROCEDURE — 70544 MR ANGIOGRAPHY HEAD W/O DYE: CPT | Performed by: PHYSICIAN ASSISTANT

## 2020-10-12 ENCOUNTER — TELEPHONE (OUTPATIENT)
Dept: SCHEDULING | Age: 75
End: 2020-10-12

## 2020-10-13 PROCEDURE — 88108 CYTOPATH CONCENTRATE TECH: CPT | Performed by: UROLOGY

## 2020-10-15 ENCOUNTER — OFFICE VISIT (OUTPATIENT)
Dept: SURGERY | Facility: CLINIC | Age: 75
End: 2020-10-15
Payer: MEDICARE

## 2020-10-15 VITALS — SYSTOLIC BLOOD PRESSURE: 128 MMHG | DIASTOLIC BLOOD PRESSURE: 79 MMHG | HEART RATE: 69 BPM | TEMPERATURE: 97 F

## 2020-10-15 DIAGNOSIS — E03.9 ACQUIRED HYPOTHYROIDISM: ICD-10-CM

## 2020-10-15 DIAGNOSIS — Z80.0 FH: COLON CANCER: Primary | ICD-10-CM

## 2020-10-15 DIAGNOSIS — Z01.818 PRE-OP TESTING: ICD-10-CM

## 2020-10-15 DIAGNOSIS — Z95.1 H/O HEART BYPASS SURGERY: ICD-10-CM

## 2020-10-15 DIAGNOSIS — I10 ESSENTIAL HYPERTENSION: ICD-10-CM

## 2020-10-15 DIAGNOSIS — G47.33 OSA (OBSTRUCTIVE SLEEP APNEA): ICD-10-CM

## 2020-10-15 DIAGNOSIS — I25.10 CORONARY ARTERY DISEASE INVOLVING NATIVE CORONARY ARTERY OF NATIVE HEART WITHOUT ANGINA PECTORIS: ICD-10-CM

## 2020-10-15 PROCEDURE — 99203 OFFICE O/P NEW LOW 30 MIN: CPT | Performed by: COLON & RECTAL SURGERY

## 2020-10-15 RX ORDER — POLYETHYLENE GLYCOL 3350, SODIUM CHLORIDE, SODIUM BICARBONATE, POTASSIUM CHLORIDE 420; 11.2; 5.72; 1.48 G/4L; G/4L; G/4L; G/4L
POWDER, FOR SOLUTION ORAL
Qty: 1 BOTTLE | Refills: 0 | Status: SHIPPED | OUTPATIENT
Start: 2020-10-15 | End: 2020-11-04

## 2020-10-15 NOTE — H&P
New Patient Visit Note       Active Problems      1. FH: colon cancer    2. H/O heart bypass surgery    3. JOSÉ MANUEL (obstructive sleep apnea)    4. Coronary artery disease involving native coronary artery of native heart without angina pectoris    5.  Essential the PA  I performed my own physical exam and obtained the history as detailed above.   I have made all appropriate changes in documentation as necessary  I attest to the accuracy of this note as detailed above    MD THAD Taveras 70    My total The family history and social history have been reviewed by me today.     Family History   Problem Relation Age of Onset   • Heart Disease Father         late 62s   • Heart Disorder Father    • Dementia Father         Parkinson's Disease   • Cancer Fa Disp: 180 tablet, Rfl: 3  •  KETOCONAZOLE 2 % External Shampoo, Lather into scalp, let sit for 5 minutes then rinse. Use every other day., Disp: 120 mL, Rfl: 0  •  finasteride 5 MG Oral Tab, Take 1 tablet (5 mg total) by mouth daily. , Disp: 90 tablet, Rfl Psychiatric/Behavioral: Negative for behavioral problems and sleep disturbance. Physical Findings   /79   Pulse 69   Temp 97.1 °F (36.2 °C)   Physical Exam   Constitutional: He is oriented to person, place, and time.  He appears well-developed reviewed.           Assessment   FH: colon cancer  (primary encounter diagnosis)  H/O heart bypass surgery  JOSÉ MANUEL (obstructive sleep apnea)  Coronary artery disease involving native coronary artery of native heart without angina pectoris  Essential hypertensi procedure as well as clearance to be off of his Coumadin and aspirin prior to the procedure. All risks, benefits, complications and alternatives to the proposed operation were fully discussed with the patient.  All questions from the patient were answere

## 2020-10-15 NOTE — PATIENT INSTRUCTIONS
The patient presents today in consultation for a family history of colon cancer. The patient states that he has a significant family history of colon cancer in his father who was diagnosed with colon cancer at the age of 80.   He denies any other first o and it's possible outcomes was fully discussed. The patient seemed to understand the conversation and its details. Consent for surgery was confirmed with the patient.

## 2020-11-07 ENCOUNTER — APPOINTMENT (OUTPATIENT)
Dept: LAB | Age: 75
End: 2020-11-07
Attending: COLON & RECTAL SURGERY
Payer: MEDICARE

## 2020-11-07 DIAGNOSIS — Z01.818 PRE-OP TESTING: ICD-10-CM

## 2020-11-17 ENCOUNTER — MED REC SCAN ONLY (OUTPATIENT)
Dept: SURGERY | Facility: CLINIC | Age: 75
End: 2020-11-17

## 2020-11-23 ENCOUNTER — PATIENT OUTREACH (OUTPATIENT)
Dept: SURGERY | Facility: CLINIC | Age: 75
End: 2020-11-23

## 2021-01-29 DIAGNOSIS — Z23 NEED FOR VACCINATION: ICD-10-CM

## 2021-02-16 ENCOUNTER — TELEPHONE (OUTPATIENT)
Dept: SCHEDULING | Age: 76
End: 2021-02-16

## 2021-07-21 ENCOUNTER — APPOINTMENT (OUTPATIENT)
Dept: CT IMAGING | Facility: HOSPITAL | Age: 76
End: 2021-07-21
Attending: EMERGENCY MEDICINE
Payer: MEDICARE

## 2021-07-21 ENCOUNTER — APPOINTMENT (OUTPATIENT)
Dept: GENERAL RADIOLOGY | Facility: HOSPITAL | Age: 76
End: 2021-07-21
Attending: EMERGENCY MEDICINE
Payer: MEDICARE

## 2021-07-21 ENCOUNTER — HOSPITAL ENCOUNTER (OUTPATIENT)
Facility: HOSPITAL | Age: 76
Setting detail: OBSERVATION
Discharge: HOME | End: 2021-07-22
Attending: EMERGENCY MEDICINE | Admitting: HOSPITALIST
Payer: MEDICARE

## 2021-07-21 DIAGNOSIS — R07.9 CHEST PAIN OF UNCERTAIN ETIOLOGY: Primary | ICD-10-CM

## 2021-07-21 LAB
ALBUMIN SERPL-MCNC: 3.4 G/DL (ref 3.4–5)
ALBUMIN/GLOB SERPL: 0.9 {RATIO} (ref 1–2)
ALP LIVER SERPL-CCNC: 55 U/L
ALT SERPL-CCNC: 28 U/L
ANION GAP SERPL CALC-SCNC: 5 MMOL/L (ref 0–18)
AST SERPL-CCNC: 21 U/L (ref 15–37)
ATRIAL RATE: 66 BPM
BASOPHILS # BLD AUTO: 0.01 X10(3) UL (ref 0–0.2)
BASOPHILS NFR BLD AUTO: 0.2 %
BILIRUB SERPL-MCNC: 0.8 MG/DL (ref 0.1–2)
BUN BLD-MCNC: 16 MG/DL (ref 7–18)
BUN/CREAT SERPL: 18.8 (ref 10–20)
CALCIUM BLD-MCNC: 8.1 MG/DL (ref 8.5–10.1)
CHLORIDE SERPL-SCNC: 103 MMOL/L (ref 98–112)
CO2 SERPL-SCNC: 27 MMOL/L (ref 21–32)
CREAT BLD-MCNC: 0.85 MG/DL
DEPRECATED RDW RBC AUTO: 45.2 FL (ref 35.1–46.3)
EOSINOPHIL # BLD AUTO: 0.01 X10(3) UL (ref 0–0.7)
EOSINOPHIL NFR BLD AUTO: 0.2 %
ERYTHROCYTE [DISTWIDTH] IN BLOOD BY AUTOMATED COUNT: 14.8 % (ref 11–15)
GLOBULIN PLAS-MCNC: 3.6 G/DL (ref 2.8–4.4)
GLUCOSE BLD-MCNC: 99 MG/DL (ref 70–99)
HCT VFR BLD AUTO: 44 %
HGB BLD-MCNC: 14.2 G/DL
IMM GRANULOCYTES # BLD AUTO: 0.02 X10(3) UL (ref 0–1)
IMM GRANULOCYTES NFR BLD: 0.4 %
INR BLD: 2.03 (ref 0.89–1.11)
LYMPHOCYTES # BLD AUTO: 0.38 X10(3) UL (ref 1–4)
LYMPHOCYTES NFR BLD AUTO: 7.6 %
M PROTEIN MFR SERPL ELPH: 7 G/DL (ref 6.4–8.2)
MCH RBC QN AUTO: 27.4 PG (ref 26–34)
MCHC RBC AUTO-ENTMCNC: 32.3 G/DL (ref 31–37)
MCV RBC AUTO: 84.8 FL
MONOCYTES # BLD AUTO: 0.33 X10(3) UL (ref 0.1–1)
MONOCYTES NFR BLD AUTO: 6.6 %
NEUTROPHILS # BLD AUTO: 4.22 X10 (3) UL (ref 1.5–7.7)
NEUTROPHILS # BLD AUTO: 4.22 X10(3) UL (ref 1.5–7.7)
NEUTROPHILS NFR BLD AUTO: 85 %
OSMOLALITY SERPL CALC.SUM OF ELEC: 281 MOSM/KG (ref 275–295)
P AXIS: -2 DEGREES
P-R INTERVAL: 238 MS
PLATELET # BLD AUTO: 130 10(3)UL (ref 150–450)
POTASSIUM SERPL-SCNC: 3.4 MMOL/L (ref 3.5–5.1)
PSA SERPL DL<=0.01 NG/ML-MCNC: 23.5 SECONDS (ref 12.4–14.6)
Q-T INTERVAL: 436 MS
QRS DURATION: 152 MS
QTC CALCULATION (BEZET): 457 MS
R AXIS: 87 DEGREES
RBC # BLD AUTO: 5.19 X10(6)UL
SODIUM SERPL-SCNC: 135 MMOL/L (ref 136–145)
T AXIS: 82 DEGREES
TROPONIN I SERPL-MCNC: <0.045 NG/ML (ref ?–0.04)
TROPONIN I SERPL-MCNC: <0.045 NG/ML (ref ?–0.04)
VENTRICULAR RATE: 66 BPM
WBC # BLD AUTO: 5 X10(3) UL (ref 4–11)

## 2021-07-21 PROCEDURE — 99285 EMERGENCY DEPT VISIT HI MDM: CPT

## 2021-07-21 PROCEDURE — 85025 COMPLETE CBC W/AUTO DIFF WBC: CPT | Performed by: EMERGENCY MEDICINE

## 2021-07-21 PROCEDURE — 93005 ELECTROCARDIOGRAM TRACING: CPT

## 2021-07-21 PROCEDURE — 84484 ASSAY OF TROPONIN QUANT: CPT | Performed by: EMERGENCY MEDICINE

## 2021-07-21 PROCEDURE — 96374 THER/PROPH/DIAG INJ IV PUSH: CPT

## 2021-07-21 PROCEDURE — 93010 ELECTROCARDIOGRAM REPORT: CPT | Performed by: INTERNAL MEDICINE

## 2021-07-21 PROCEDURE — 85610 PROTHROMBIN TIME: CPT | Performed by: EMERGENCY MEDICINE

## 2021-07-21 PROCEDURE — 71260 CT THORAX DX C+: CPT | Performed by: EMERGENCY MEDICINE

## 2021-07-21 PROCEDURE — 80053 COMPREHEN METABOLIC PANEL: CPT | Performed by: EMERGENCY MEDICINE

## 2021-07-21 PROCEDURE — 71045 X-RAY EXAM CHEST 1 VIEW: CPT | Performed by: EMERGENCY MEDICINE

## 2021-07-21 PROCEDURE — 93010 ELECTROCARDIOGRAM REPORT: CPT

## 2021-07-21 RX ORDER — NITROGLYCERIN 0.4 MG/1
0.4 TABLET SUBLINGUAL EVERY 5 MIN PRN
Status: DISCONTINUED | OUTPATIENT
Start: 2021-07-21 | End: 2021-07-22

## 2021-07-21 RX ORDER — METOCLOPRAMIDE HYDROCHLORIDE 5 MG/ML
10 INJECTION INTRAMUSCULAR; INTRAVENOUS EVERY 8 HOURS PRN
Status: DISCONTINUED | OUTPATIENT
Start: 2021-07-21 | End: 2021-07-22

## 2021-07-21 RX ORDER — FINASTERIDE 5 MG/1
5 TABLET, FILM COATED ORAL DAILY
Status: DISCONTINUED | OUTPATIENT
Start: 2021-07-21 | End: 2021-07-22

## 2021-07-21 RX ORDER — ONDANSETRON 2 MG/ML
INJECTION INTRAMUSCULAR; INTRAVENOUS
Status: COMPLETED
Start: 2021-07-21 | End: 2021-07-21

## 2021-07-21 RX ORDER — POTASSIUM CHLORIDE 20 MEQ/1
40 TABLET, EXTENDED RELEASE ORAL EVERY 4 HOURS
Status: COMPLETED | OUTPATIENT
Start: 2021-07-21 | End: 2021-07-22

## 2021-07-21 RX ORDER — FINASTERIDE 5 MG/1
5 TABLET, FILM COATED ORAL DAILY
Status: DISCONTINUED | OUTPATIENT
Start: 2021-07-22 | End: 2021-07-21

## 2021-07-21 RX ORDER — ALPRAZOLAM 0.5 MG/1
0.25 TABLET ORAL 3 TIMES DAILY PRN
Status: DISCONTINUED | OUTPATIENT
Start: 2021-07-21 | End: 2021-07-22

## 2021-07-21 RX ORDER — HEPARIN SODIUM 5000 [USP'U]/ML
5000 INJECTION, SOLUTION INTRAVENOUS; SUBCUTANEOUS EVERY 8 HOURS SCHEDULED
Status: DISCONTINUED | OUTPATIENT
Start: 2021-07-21 | End: 2021-07-22

## 2021-07-21 RX ORDER — ASPIRIN 81 MG/1
324 TABLET, CHEWABLE ORAL ONCE
Status: DISCONTINUED | OUTPATIENT
Start: 2021-07-21 | End: 2021-07-22

## 2021-07-21 RX ORDER — ONDANSETRON 2 MG/ML
4 INJECTION INTRAMUSCULAR; INTRAVENOUS ONCE
Status: COMPLETED | OUTPATIENT
Start: 2021-07-21 | End: 2021-07-21

## 2021-07-21 RX ORDER — ACETAMINOPHEN 325 MG/1
650 TABLET ORAL EVERY 6 HOURS PRN
Status: DISCONTINUED | OUTPATIENT
Start: 2021-07-21 | End: 2021-07-22

## 2021-07-21 RX ORDER — FAMOTIDINE 20 MG/1
20 TABLET ORAL DAILY
Status: DISCONTINUED | OUTPATIENT
Start: 2021-07-21 | End: 2021-07-22

## 2021-07-21 RX ORDER — LEVOTHYROXINE SODIUM 0.1 MG/1
100 TABLET ORAL DAILY
Status: DISCONTINUED | OUTPATIENT
Start: 2021-07-22 | End: 2021-07-22

## 2021-07-21 RX ORDER — ONDANSETRON 2 MG/ML
4 INJECTION INTRAMUSCULAR; INTRAVENOUS EVERY 6 HOURS PRN
Status: DISCONTINUED | OUTPATIENT
Start: 2021-07-21 | End: 2021-07-22

## 2021-07-21 NOTE — ED PROVIDER NOTES
Patient Seen in: [facility] Emergency Department      History   Patient presents with:  Chest Pain Angina    Stated Complaint: chest pain     HPI/Subjective:   HPI    The patient is a 45-year-old male with multiple past medical problems including co hypertension    • Unspecified sleep apnea SPLIT NIGHT 12-14-14    AHI 38 RDI 38 SaO2 reza 85 % CPAP 6 [employer]   • Visual impairment               Past Surgical History:   Procedure Laterality Date   • ANGIOGRAM     • ANGIOPLASTY (CORONARY)      in the 199 palpation throughout all 4 quadrants, epigastrium and suprapubic regions. No CVA tenderness. Extremities: No deformity, nontender throughout, and normal active range of motion of all 4 extremities. Distal pulses normal and symmetric.   No calf swelling, a EKG, but similar to his previous EKG obtained on November 17, 2020, and there is no evidence of acute ischemic process. On arrival of the patient an EKG was obtained which showed no acute process.   The patient was placed on continuous pulse ox the upper abdomen are unremarkable. BONES:  No bony lesion or fracture. Left-sided thyroid nodule is stable measuring approximately 2.7 cm.   The     stability would suggest a benign diagnosis.                                 =====    CONCLUS disposition: 7/21/2021  6:39 PM                                  Disposition and Plan     Clinical Impression:  Chest pain of uncertain etiology  (primary encounter diagnosis)     Disposition:  Admit  7/21/2021  6:39 pm    Follow-up:  No follow-up provider No

## 2021-07-22 VITALS
DIASTOLIC BLOOD PRESSURE: 68 MMHG | RESPIRATION RATE: 18 BRPM | HEART RATE: 60 BPM | SYSTOLIC BLOOD PRESSURE: 130 MMHG | OXYGEN SATURATION: 99 % | TEMPERATURE: 98 F | BODY MASS INDEX: 32 KG/M2 | WEIGHT: 249.13 LBS

## 2021-07-22 LAB
ANION GAP SERPL CALC-SCNC: 2 MMOL/L (ref 0–18)
ATRIAL RATE: 52 BPM
BUN BLD-MCNC: 18 MG/DL (ref 7–18)
BUN/CREAT SERPL: 20.7 (ref 10–20)
CALCIUM BLD-MCNC: 8.3 MG/DL (ref 8.5–10.1)
CHLORIDE SERPL-SCNC: 106 MMOL/L (ref 98–112)
CHOLEST SMN-MCNC: 116 MG/DL (ref ?–200)
CO2 SERPL-SCNC: 30 MMOL/L (ref 21–32)
CREAT BLD-MCNC: 0.87 MG/DL
DEPRECATED RDW RBC AUTO: 46.6 FL (ref 35.1–46.3)
ERYTHROCYTE [DISTWIDTH] IN BLOOD BY AUTOMATED COUNT: 14.9 % (ref 11–15)
GLUCOSE BLD-MCNC: 104 MG/DL (ref 70–99)
HCT VFR BLD AUTO: 42.6 %
HDLC SERPL-MCNC: 30 MG/DL (ref 40–59)
HGB BLD-MCNC: 13.7 G/DL
INR BLD: 1.96 (ref 0.89–1.11)
LDLC SERPL CALC-MCNC: 65 MG/DL (ref ?–100)
MCH RBC QN AUTO: 27.7 PG (ref 26–34)
MCHC RBC AUTO-ENTMCNC: 32.2 G/DL (ref 31–37)
MCV RBC AUTO: 86.1 FL
NONHDLC SERPL-MCNC: 86 MG/DL (ref ?–130)
OSMOLALITY SERPL CALC.SUM OF ELEC: 288 MOSM/KG (ref 275–295)
P AXIS: 49 DEGREES
P-R INTERVAL: 240 MS
PLATELET # BLD AUTO: 114 10(3)UL (ref 150–450)
POTASSIUM SERPL-SCNC: 3.9 MMOL/L (ref 3.5–5.1)
POTASSIUM SERPL-SCNC: 4.4 MMOL/L (ref 3.5–5.1)
PSA SERPL DL<=0.01 NG/ML-MCNC: 22.8 SECONDS (ref 12.4–14.6)
Q-T INTERVAL: 508 MS
QRS DURATION: 158 MS
QTC CALCULATION (BEZET): 472 MS
R AXIS: 75 DEGREES
RBC # BLD AUTO: 4.95 X10(6)UL
SODIUM SERPL-SCNC: 138 MMOL/L (ref 136–145)
T AXIS: 87 DEGREES
TRIGL SERPL-MCNC: 115 MG/DL (ref 30–149)
TROPONIN I SERPL-MCNC: <0.045 NG/ML (ref ?–0.04)
VENTRICULAR RATE: 52 BPM
VLDLC SERPL CALC-MCNC: 17 MG/DL (ref 0–30)
WBC # BLD AUTO: 3.7 X10(3) UL (ref 4–11)

## 2021-07-22 PROCEDURE — 84484 ASSAY OF TROPONIN QUANT: CPT | Performed by: HOSPITALIST

## 2021-07-22 PROCEDURE — 84132 ASSAY OF SERUM POTASSIUM: CPT | Performed by: HOSPITALIST

## 2021-07-22 PROCEDURE — 85610 PROTHROMBIN TIME: CPT | Performed by: HOSPITALIST

## 2021-07-22 PROCEDURE — 80048 BASIC METABOLIC PNL TOTAL CA: CPT | Performed by: HOSPITALIST

## 2021-07-22 PROCEDURE — 80061 LIPID PANEL: CPT | Performed by: HOSPITALIST

## 2021-07-22 PROCEDURE — 85027 COMPLETE CBC AUTOMATED: CPT | Performed by: HOSPITALIST

## 2021-07-22 PROCEDURE — 36415 COLL VENOUS BLD VENIPUNCTURE: CPT | Performed by: HOSPITALIST

## 2021-07-22 NOTE — H&P
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  LIMA to LAD, SVG to D1, SVG to D2   • CABG     • COLONOSCOPY  2015    Dr. Loulou Avitia, at [facility], polyps otherwise negative.     • OTHER SURGICAL HISTORY  2009    right index finger sep hours as needed for Nausea., Disp: 10 tablet, Rfl: 0  famoTIDine 20 MG Oral Tab, One tablet daily, Disp: 90 tablet, Rfl: 3  Meclizine HCl 25 MG Oral Tab, Take 1 tablet (25 mg total) by mouth 3 (three) times daily as needed. , Disp: 15 tablet, Rfl: 0 07/21/2021    TP 7.0 07/21/2021    AST 21 07/21/2021    ALT 28 07/21/2021    INR 1.96 07/22/2021    PTP 22.8 07/22/2021    TROP <0.045 07/22/2021       CXR: image personally reviewed.       Radiology: XR CHEST AP PORTABLE  (CPT=71045)    Result Date: 7/21/2 paratracheal lymph node measuring approximately 15 x 15 mm which is likely benign given that is stable since previous study. CARDIAC:  There is severe coronary calcifications. There is mild cardiomegaly. PLEURA:  No mass or effusion.   THORACIC AORTA:  No Maria A Johnson DO  [facility]ist

## 2021-07-22 NOTE — PLAN OF CARE
NURSING ADMISSION NOTE      Patient admitted via Cart at 2100. Oriented to room. Safety precautions initiated. Bed in low position. Call light in reach. Pt denies pain at this time. Discussed POC with pt and all orders.  Pt aware to inform Rn of any

## 2021-07-22 NOTE — CONSULTS
[facility] Cardiology Consultation    Victor Manuel Santana Patient Status:  Observation    1945 MRN [id number]   [facility] 0SW-A Attending Vivian Mo,    Hosp Day # 0 PCP Venu Mohamud MD     Reason for Consultation:  Chest pain, kn removal    • OTHER SURGICAL HISTORY  10/20/2020    Cystoscopy Dr. Ronaldo Bragg   • TONSILLECTOMY       Family History   Problem Relation Age of Onset   • Heart Disease Father         late 62s   • Heart Disorder Father    • Dementia Father         Parkinson's Dise BRISEIDA    CXR, 7/22/2021:  neg    Labs:   HEM:  Recent Labs   Lab 07/21/21  1629 07/22/21  0505   WBC 5.0 3.7*   HGB 14.2 13.7   .0* 114.0*       Chem:  Recent Labs   Lab 07/21/21 1629 07/22/21  0505 07/22/21  0824   * 138  --    K 3.4* 4.4 3.9

## 2021-07-22 NOTE — PLAN OF CARE
Alert & oriented. Feeling better. States still feels his breathing is not back to normal. 02 sats >95 on room air. Pt showered this am & ambulated in aguilar. Tolerated well. Discharged home with follow ups in place, questions answerted.

## 2021-07-26 PROBLEM — D69.6 PLATELETS DECREASED (HCC): Status: ACTIVE | Noted: 2021-07-26

## 2021-09-16 ENCOUNTER — HOSPITAL ENCOUNTER (OUTPATIENT)
Facility: HOSPITAL | Age: 76
Setting detail: OBSERVATION
Discharge: HOME | End: 2021-09-18
Attending: EMERGENCY MEDICINE | Admitting: INTERNAL MEDICINE
Payer: MEDICARE

## 2021-09-16 ENCOUNTER — APPOINTMENT (OUTPATIENT)
Dept: CT IMAGING | Facility: HOSPITAL | Age: 76
End: 2021-09-16
Attending: EMERGENCY MEDICINE
Payer: MEDICARE

## 2021-09-16 ENCOUNTER — APPOINTMENT (OUTPATIENT)
Dept: GENERAL RADIOLOGY | Facility: HOSPITAL | Age: 76
End: 2021-09-16
Attending: EMERGENCY MEDICINE
Payer: MEDICARE

## 2021-09-16 DIAGNOSIS — R55 SYNCOPE AND COLLAPSE: Primary | ICD-10-CM

## 2021-09-16 DIAGNOSIS — Z79.01 ANTICOAGULATED BY ANTICOAGULATION TREATMENT: ICD-10-CM

## 2021-09-16 DIAGNOSIS — I62.9 INTRACRANIAL HEMORRHAGE (HCC): ICD-10-CM

## 2021-09-16 PROBLEM — D69.6 THROMBOCYTOPENIA (HCC): Status: ACTIVE | Noted: 2021-09-16

## 2021-09-16 PROBLEM — R73.9 HYPERGLYCEMIA: Status: ACTIVE | Noted: 2021-09-16

## 2021-09-16 PROBLEM — R79.89 AZOTEMIA: Status: ACTIVE | Noted: 2021-09-16

## 2021-09-16 LAB
ALBUMIN SERPL-MCNC: 3.5 G/DL (ref 3.4–5)
ALBUMIN/GLOB SERPL: 1 {RATIO} (ref 1–2)
ALP LIVER SERPL-CCNC: 52 U/L
ALT SERPL-CCNC: 26 U/L
ANION GAP SERPL CALC-SCNC: 4 MMOL/L (ref 0–18)
APTT PPP: 34 SECONDS (ref 25.4–36.1)
AST SERPL-CCNC: 19 U/L (ref 15–37)
BASOPHILS # BLD AUTO: 0.03 X10(3) UL (ref 0–0.2)
BASOPHILS NFR BLD AUTO: 0.4 %
BILIRUB SERPL-MCNC: 0.8 MG/DL (ref 0.1–2)
BUN BLD-MCNC: 21 MG/DL (ref 7–18)
CALCIUM BLD-MCNC: 8.4 MG/DL (ref 8.5–10.1)
CHLORIDE SERPL-SCNC: 105 MMOL/L (ref 98–112)
CO2 SERPL-SCNC: 29 MMOL/L (ref 21–32)
CREAT BLD-MCNC: 0.88 MG/DL
EOSINOPHIL # BLD AUTO: 0.06 X10(3) UL (ref 0–0.7)
EOSINOPHIL NFR BLD AUTO: 0.7 %
ERYTHROCYTE [DISTWIDTH] IN BLOOD BY AUTOMATED COUNT: 14.7 %
GLOBULIN PLAS-MCNC: 3.6 G/DL (ref 2.8–4.4)
GLUCOSE BLD-MCNC: 117 MG/DL (ref 70–99)
HCT VFR BLD AUTO: 42.1 %
HGB BLD-MCNC: 13.8 G/DL
IMM GRANULOCYTES # BLD AUTO: 0.04 X10(3) UL (ref 0–1)
IMM GRANULOCYTES NFR BLD: 0.5 %
INR BLD: 1.56 (ref 0.89–1.11)
LYMPHOCYTES # BLD AUTO: 0.68 X10(3) UL (ref 1–4)
LYMPHOCYTES NFR BLD AUTO: 8.1 %
MCH RBC QN AUTO: 27.7 PG (ref 26–34)
MCHC RBC AUTO-ENTMCNC: 32.8 G/DL (ref 31–37)
MCV RBC AUTO: 84.5 FL
MONOCYTES # BLD AUTO: 0.6 X10(3) UL (ref 0.1–1)
MONOCYTES NFR BLD AUTO: 7.1 %
NEUTROPHILS # BLD AUTO: 7 X10 (3) UL (ref 1.5–7.7)
NEUTROPHILS # BLD AUTO: 7 X10(3) UL (ref 1.5–7.7)
NEUTROPHILS NFR BLD AUTO: 83.2 %
OSMOLALITY SERPL CALC.SUM OF ELEC: 290 MOSM/KG (ref 275–295)
PLATELET # BLD AUTO: 149 10(3)UL (ref 150–450)
POTASSIUM SERPL-SCNC: 3.2 MMOL/L (ref 3.5–5.1)
PROT SERPL-MCNC: 7.1 G/DL (ref 6.4–8.2)
PROTHROMBIN TIME: 19 SECONDS (ref 12.2–14.5)
RBC # BLD AUTO: 4.98 X10(6)UL
SARS-COV-2 RNA RESP QL NAA+PROBE: NOT DETECTED
SODIUM SERPL-SCNC: 138 MMOL/L (ref 136–145)
TROPONIN I SERPL-MCNC: <0.045 NG/ML (ref ?–0.04)
WBC # BLD AUTO: 8.4 X10(3) UL (ref 4–11)

## 2021-09-16 PROCEDURE — 99285 EMERGENCY DEPT VISIT HI MDM: CPT

## 2021-09-16 PROCEDURE — 93010 ELECTROCARDIOGRAM REPORT: CPT

## 2021-09-16 PROCEDURE — 96365 THER/PROPH/DIAG IV INF INIT: CPT

## 2021-09-16 PROCEDURE — 70450 CT HEAD/BRAIN W/O DYE: CPT | Performed by: EMERGENCY MEDICINE

## 2021-09-16 PROCEDURE — 72125 CT NECK SPINE W/O DYE: CPT | Performed by: EMERGENCY MEDICINE

## 2021-09-16 PROCEDURE — 80053 COMPREHEN METABOLIC PANEL: CPT | Performed by: EMERGENCY MEDICINE

## 2021-09-16 PROCEDURE — 85610 PROTHROMBIN TIME: CPT | Performed by: EMERGENCY MEDICINE

## 2021-09-16 PROCEDURE — 84484 ASSAY OF TROPONIN QUANT: CPT | Performed by: EMERGENCY MEDICINE

## 2021-09-16 PROCEDURE — 96375 TX/PRO/DX INJ NEW DRUG ADDON: CPT

## 2021-09-16 PROCEDURE — 93005 ELECTROCARDIOGRAM TRACING: CPT

## 2021-09-16 PROCEDURE — 85730 THROMBOPLASTIN TIME PARTIAL: CPT | Performed by: EMERGENCY MEDICINE

## 2021-09-16 PROCEDURE — 85025 COMPLETE CBC W/AUTO DIFF WBC: CPT | Performed by: EMERGENCY MEDICINE

## 2021-09-16 PROCEDURE — 71045 X-RAY EXAM CHEST 1 VIEW: CPT | Performed by: EMERGENCY MEDICINE

## 2021-09-16 RX ORDER — POTASSIUM CHLORIDE 20 MEQ/1
40 TABLET, EXTENDED RELEASE ORAL ONCE
Status: COMPLETED | OUTPATIENT
Start: 2021-09-16 | End: 2021-09-16

## 2021-09-17 ENCOUNTER — APPOINTMENT (OUTPATIENT)
Dept: CV DIAGNOSTICS | Facility: HOSPITAL | Age: 76
End: 2021-09-17
Attending: NURSE PRACTITIONER
Payer: MEDICARE

## 2021-09-17 ENCOUNTER — APPOINTMENT (OUTPATIENT)
Dept: CT IMAGING | Facility: HOSPITAL | Age: 76
End: 2021-09-17
Attending: NURSE PRACTITIONER
Payer: MEDICARE

## 2021-09-17 ENCOUNTER — NURSE ONLY (OUTPATIENT)
Dept: ELECTROPHYSIOLOGY | Facility: HOSPITAL | Age: 76
End: 2021-09-17
Attending: NURSE PRACTITIONER
Payer: MEDICARE

## 2021-09-17 ENCOUNTER — APPOINTMENT (OUTPATIENT)
Dept: CT IMAGING | Facility: HOSPITAL | Age: 76
End: 2021-09-17
Attending: EMERGENCY MEDICINE
Payer: MEDICARE

## 2021-09-17 PROBLEM — Z79.01 ANTICOAGULATED BY ANTICOAGULATION TREATMENT: Status: ACTIVE | Noted: 2021-09-17

## 2021-09-17 PROBLEM — I62.9 INTRACRANIAL HEMORRHAGE (HCC): Status: ACTIVE | Noted: 2021-09-17

## 2021-09-17 LAB
ANION GAP SERPL CALC-SCNC: 5 MMOL/L (ref 0–18)
ANTIBODY SCREEN: NEGATIVE
ATRIAL RATE: 57 BPM
ATRIAL RATE: 64 BPM
BASOPHILS # BLD AUTO: 0.03 X10(3) UL (ref 0–0.2)
BASOPHILS NFR BLD AUTO: 0.5 %
BILIRUB UR QL STRIP.AUTO: NEGATIVE
BUN BLD-MCNC: 21 MG/DL (ref 7–18)
CALCIUM BLD-MCNC: 8.7 MG/DL (ref 8.5–10.1)
CHLORIDE SERPL-SCNC: 105 MMOL/L (ref 98–112)
CHOLEST SERPL-MCNC: 134 MG/DL (ref ?–200)
CLARITY UR REFRACT.AUTO: CLEAR
CO2 SERPL-SCNC: 30 MMOL/L (ref 21–32)
COLOR UR AUTO: YELLOW
CREAT BLD-MCNC: 0.9 MG/DL
EOSINOPHIL # BLD AUTO: 0.05 X10(3) UL (ref 0–0.7)
EOSINOPHIL NFR BLD AUTO: 0.8 %
ERYTHROCYTE [DISTWIDTH] IN BLOOD BY AUTOMATED COUNT: 14.9 %
EST. AVERAGE GLUCOSE BLD GHB EST-MCNC: 131 MG/DL (ref 68–126)
GLUCOSE BLD-MCNC: 108 MG/DL (ref 70–99)
GLUCOSE BLD-MCNC: 112 MG/DL (ref 70–99)
GLUCOSE BLD-MCNC: 113 MG/DL (ref 70–99)
GLUCOSE UR STRIP.AUTO-MCNC: NEGATIVE MG/DL
HBA1C MFR BLD HPLC: 6.2 % (ref ?–5.7)
HCT VFR BLD AUTO: 41.8 %
HDLC SERPL-MCNC: 34 MG/DL (ref 40–59)
HGB BLD-MCNC: 13.2 G/DL
IMM GRANULOCYTES # BLD AUTO: 0.02 X10(3) UL (ref 0–1)
IMM GRANULOCYTES NFR BLD: 0.3 %
INR BLD: 1.33 (ref 0.89–1.11)
KETONES UR STRIP.AUTO-MCNC: NEGATIVE MG/DL
LDLC SERPL CALC-MCNC: 82 MG/DL (ref ?–100)
LEUKOCYTE ESTERASE UR QL STRIP.AUTO: NEGATIVE
LYMPHOCYTES # BLD AUTO: 0.88 X10(3) UL (ref 1–4)
LYMPHOCYTES NFR BLD AUTO: 14.4 %
MCH RBC QN AUTO: 27.2 PG (ref 26–34)
MCHC RBC AUTO-ENTMCNC: 31.6 G/DL (ref 31–37)
MCV RBC AUTO: 86 FL
MONOCYTES # BLD AUTO: 0.64 X10(3) UL (ref 0.1–1)
MONOCYTES NFR BLD AUTO: 10.4 %
NEUTROPHILS # BLD AUTO: 4.51 X10 (3) UL (ref 1.5–7.7)
NEUTROPHILS # BLD AUTO: 4.51 X10(3) UL (ref 1.5–7.7)
NEUTROPHILS NFR BLD AUTO: 73.6 %
NITRITE UR QL STRIP.AUTO: NEGATIVE
NONHDLC SERPL-MCNC: 100 MG/DL (ref ?–130)
OSMOLALITY SERPL CALC.SUM OF ELEC: 294 MOSM/KG (ref 275–295)
P AXIS: 40 DEGREES
P AXIS: 44 DEGREES
P-R INTERVAL: 236 MS
P-R INTERVAL: 242 MS
PH UR STRIP.AUTO: 5 [PH] (ref 5–8)
PLATELET # BLD AUTO: 140 10(3)UL (ref 150–450)
POTASSIUM SERPL-SCNC: 3.7 MMOL/L (ref 3.5–5.1)
PROT UR STRIP.AUTO-MCNC: NEGATIVE MG/DL
PROTHROMBIN TIME: 16.8 SECONDS (ref 12.2–14.5)
Q-T INTERVAL: 474 MS
Q-T INTERVAL: 478 MS
QRS DURATION: 156 MS
QRS DURATION: 158 MS
QTC CALCULATION (BEZET): 461 MS
QTC CALCULATION (BEZET): 493 MS
R AXIS: 82 DEGREES
R AXIS: 82 DEGREES
RBC # BLD AUTO: 4.86 X10(6)UL
RBC UR QL AUTO: NEGATIVE
RH BLOOD TYPE: POSITIVE
SODIUM SERPL-SCNC: 140 MMOL/L (ref 136–145)
SP GR UR STRIP.AUTO: >1.03 (ref 1–1.03)
T AXIS: 81 DEGREES
T AXIS: 84 DEGREES
TRIGL SERPL-MCNC: 93 MG/DL (ref 30–149)
UROBILINOGEN UR STRIP.AUTO-MCNC: <2 MG/DL
VENTRICULAR RATE: 57 BPM
VENTRICULAR RATE: 64 BPM
VLDLC SERPL CALC-MCNC: 15 MG/DL (ref 0–30)
WBC # BLD AUTO: 6.1 X10(3) UL (ref 4–11)

## 2021-09-17 PROCEDURE — 71275 CT ANGIOGRAPHY CHEST: CPT | Performed by: EMERGENCY MEDICINE

## 2021-09-17 PROCEDURE — 81003 URINALYSIS AUTO W/O SCOPE: CPT | Performed by: NURSE PRACTITIONER

## 2021-09-17 PROCEDURE — 4A10X4Z MONITORING OF CENTRAL NERVOUS ELECTRICAL ACTIVITY, EXTERNAL APPROACH: ICD-10-PCS | Performed by: OTHER

## 2021-09-17 PROCEDURE — 93306 TTE W/DOPPLER COMPLETE: CPT | Performed by: NURSE PRACTITIONER

## 2021-09-17 PROCEDURE — 93005 ELECTROCARDIOGRAM TRACING: CPT

## 2021-09-17 PROCEDURE — 97165 OT EVAL LOW COMPLEX 30 MIN: CPT

## 2021-09-17 PROCEDURE — 96125 COGNITIVE TEST BY HC PRO: CPT

## 2021-09-17 PROCEDURE — 95816 EEG AWAKE AND DROWSY: CPT

## 2021-09-17 PROCEDURE — 82962 GLUCOSE BLOOD TEST: CPT

## 2021-09-17 PROCEDURE — 97161 PT EVAL LOW COMPLEX 20 MIN: CPT

## 2021-09-17 PROCEDURE — 97116 GAIT TRAINING THERAPY: CPT

## 2021-09-17 PROCEDURE — 86927 PLASMA FRESH FROZEN: CPT

## 2021-09-17 PROCEDURE — 86901 BLOOD TYPING SEROLOGIC RH(D): CPT | Performed by: EMERGENCY MEDICINE

## 2021-09-17 PROCEDURE — 83036 HEMOGLOBIN GLYCOSYLATED A1C: CPT | Performed by: NURSE PRACTITIONER

## 2021-09-17 PROCEDURE — 97110 THERAPEUTIC EXERCISES: CPT

## 2021-09-17 PROCEDURE — 30233K1 TRANSFUSION OF NONAUTOLOGOUS FROZEN PLASMA INTO PERIPHERAL VEIN, PERCUTANEOUS APPROACH: ICD-10-PCS | Performed by: INTERNAL MEDICINE

## 2021-09-17 PROCEDURE — 85025 COMPLETE CBC W/AUTO DIFF WBC: CPT | Performed by: NURSE PRACTITIONER

## 2021-09-17 PROCEDURE — 80048 BASIC METABOLIC PNL TOTAL CA: CPT | Performed by: NURSE PRACTITIONER

## 2021-09-17 PROCEDURE — 80061 LIPID PANEL: CPT | Performed by: NURSE PRACTITIONER

## 2021-09-17 PROCEDURE — 36430 TRANSFUSION BLD/BLD COMPNT: CPT

## 2021-09-17 PROCEDURE — 85610 PROTHROMBIN TIME: CPT | Performed by: NURSE PRACTITIONER

## 2021-09-17 PROCEDURE — 86900 BLOOD TYPING SEROLOGIC ABO: CPT | Performed by: EMERGENCY MEDICINE

## 2021-09-17 PROCEDURE — 86850 RBC ANTIBODY SCREEN: CPT | Performed by: EMERGENCY MEDICINE

## 2021-09-17 PROCEDURE — 92610 EVALUATE SWALLOWING FUNCTION: CPT

## 2021-09-17 PROCEDURE — 70450 CT HEAD/BRAIN W/O DYE: CPT | Performed by: NURSE PRACTITIONER

## 2021-09-17 RX ORDER — LABETALOL HYDROCHLORIDE 5 MG/ML
10 INJECTION, SOLUTION INTRAVENOUS EVERY 10 MIN PRN
Status: DISCONTINUED | OUTPATIENT
Start: 2021-09-17 | End: 2021-09-18

## 2021-09-17 RX ORDER — DOCUSATE SODIUM 100 MG/1
100 CAPSULE, LIQUID FILLED ORAL 2 TIMES DAILY PRN
Status: DISCONTINUED | OUTPATIENT
Start: 2021-09-17 | End: 2021-09-18

## 2021-09-17 RX ORDER — SERTRALINE HYDROCHLORIDE 100 MG/1
100 TABLET, FILM COATED ORAL 2 TIMES DAILY
Status: DISCONTINUED | OUTPATIENT
Start: 2021-09-17 | End: 2021-09-18

## 2021-09-17 RX ORDER — LISINOPRIL 10 MG/1
10 TABLET ORAL DAILY
Status: DISCONTINUED | OUTPATIENT
Start: 2021-09-17 | End: 2021-09-18

## 2021-09-17 RX ORDER — METOCLOPRAMIDE HYDROCHLORIDE 5 MG/ML
10 INJECTION INTRAMUSCULAR; INTRAVENOUS EVERY 8 HOURS PRN
Status: DISCONTINUED | OUTPATIENT
Start: 2021-09-17 | End: 2021-09-18

## 2021-09-17 RX ORDER — ALPRAZOLAM 0.25 MG/1
0.25 TABLET ORAL 3 TIMES DAILY PRN
Status: DISCONTINUED | OUTPATIENT
Start: 2021-09-17 | End: 2021-09-18

## 2021-09-17 RX ORDER — WARFARIN SODIUM 5 MG/1
5 TABLET ORAL
COMMUNITY
End: 2021-09-18

## 2021-09-17 RX ORDER — LEVETIRACETAM 500 MG/1
1000 TABLET ORAL 2 TIMES DAILY
Status: DISCONTINUED | OUTPATIENT
Start: 2021-09-17 | End: 2021-09-18

## 2021-09-17 RX ORDER — ACETAMINOPHEN 325 MG/1
650 TABLET ORAL EVERY 4 HOURS PRN
Status: DISCONTINUED | OUTPATIENT
Start: 2021-09-17 | End: 2021-09-18

## 2021-09-17 RX ORDER — ACETAMINOPHEN 650 MG/1
650 SUPPOSITORY RECTAL EVERY 4 HOURS PRN
Status: DISCONTINUED | OUTPATIENT
Start: 2021-09-17 | End: 2021-09-18

## 2021-09-17 RX ORDER — FAMOTIDINE 20 MG/1
40 TABLET, FILM COATED ORAL DAILY
Status: DISCONTINUED | OUTPATIENT
Start: 2021-09-17 | End: 2021-09-18

## 2021-09-17 RX ORDER — METOPROLOL SUCCINATE 25 MG/1
25 TABLET, EXTENDED RELEASE ORAL DAILY
Status: DISCONTINUED | OUTPATIENT
Start: 2021-09-17 | End: 2021-09-17

## 2021-09-17 RX ORDER — ONDANSETRON 2 MG/ML
4 INJECTION INTRAMUSCULAR; INTRAVENOUS EVERY 6 HOURS PRN
Status: DISCONTINUED | OUTPATIENT
Start: 2021-09-17 | End: 2021-09-18

## 2021-09-17 RX ORDER — SODIUM CHLORIDE 9 MG/ML
INJECTION, SOLUTION INTRAVENOUS CONTINUOUS
Status: DISCONTINUED | OUTPATIENT
Start: 2021-09-17 | End: 2021-09-17

## 2021-09-17 RX ORDER — MORPHINE SULFATE 4 MG/ML
4 INJECTION, SOLUTION INTRAMUSCULAR; INTRAVENOUS ONCE
Status: COMPLETED | OUTPATIENT
Start: 2021-09-17 | End: 2021-09-17

## 2021-09-17 RX ORDER — LEVOTHYROXINE SODIUM 0.1 MG/1
100 TABLET ORAL DAILY
Status: DISCONTINUED | OUTPATIENT
Start: 2021-09-17 | End: 2021-09-18

## 2021-09-17 RX ORDER — FINASTERIDE 5 MG/1
5 TABLET, FILM COATED ORAL DAILY
Status: DISCONTINUED | OUTPATIENT
Start: 2021-09-17 | End: 2021-09-18

## 2021-09-17 RX ORDER — HYDRALAZINE HYDROCHLORIDE 20 MG/ML
10 INJECTION INTRAMUSCULAR; INTRAVENOUS EVERY 2 HOUR PRN
Status: DISCONTINUED | OUTPATIENT
Start: 2021-09-17 | End: 2021-09-18

## 2021-09-17 RX ORDER — ATORVASTATIN CALCIUM 40 MG/1
40 TABLET, FILM COATED ORAL DAILY
Status: DISCONTINUED | OUTPATIENT
Start: 2021-09-17 | End: 2021-09-18

## 2021-09-17 RX ORDER — WARFARIN SODIUM 5 MG/1
7.5 TABLET ORAL
COMMUNITY
End: 2021-09-18

## 2021-09-17 RX ORDER — LORAZEPAM 2 MG/ML
1 INJECTION INTRAMUSCULAR
Status: DISCONTINUED | OUTPATIENT
Start: 2021-09-17 | End: 2021-09-18

## 2021-09-17 NOTE — PLAN OF CARE
Received patient from ED at 0600. Patient alert and oriented, no neuro deficits noted. Denies numbness or tingling. Complains of pain in the back of the neck and discomfort in the middle chest, unchanged from when he was in the ED.  Tylenol given, passed dy

## 2021-09-17 NOTE — PHYSICAL THERAPY NOTE
PHYSICAL THERAPY EVALUATION - INPATIENT     Room Number: 0609/8413-R  Evaluation Date: 9/17/2021  Type of Evaluation: Initial  Physician Order: PT Eval and Treat    Presenting Problem: ICH  Reason for Therapy: Mobility Dysfunction and Discharge Plann Modified [place]              Modified [place]: 0                  ACTIVITY TOLERANCE  Pulse: 55  Heart Rate Source: Monitor  Resp: 18                O2 WALK  Oxygen Therapy  SPO2% on Room Air at Rest: 95    NEUROLOGICAL FINDINGS                        AM-PA training  Transfer training    Patient End of Session: Up in chair; Needs met; Call light within reach; RN aware of session/findings;  All patient questions and concerns addressed; SCDs in place Elise Callejas aware of eval session)      ASSESSMENT   Patient is

## 2021-09-17 NOTE — CONSULTS
[facility]  Neurosurgery Consult    Bala Ibarra Patient Status:  Observation    1945 MRN [id number]   [facility] 6NE-A Attending Divina Goodrich, DO   Hosp Day # 0 PCP Niels Nicholas MD     REASON FOR CONSULTATION:  [facility] at [facility], polyps otherwise negative.     • OTHER SURGICAL HISTORY  2009    right index finger sepsis s/p wart removal    • OTHER SURGICAL HISTORY  10/20/2020    Cystoscopy Dr. Charline Felder   • TONSILLECTOMY         FAMILY HISTORY:  family history includes Cancer in 2100  POTASSIUM CHLORIDE ER 20 MEQ Oral Tab CR, TAKE TWO TABLETS BY MOUTH ONCE DAILY, Disp: 180 tablet, Rfl: 2, 9/16/2021 at Unknown time  METOPROLOL SUCCINATE ER 25 MG Oral Tablet 24 Hr, TAKE ONE TABLET BY MOUTH ONCE DAILY , Disp: 90 tablet, Rfl: 2, 9/16/ brisk,  EOMI. Face is symmetrical. CN's GI. Sensation to light touch is intact bilaterally. Motor: No Drift. No arm or leg weakness noted. 5/5 bilaterally. Finger-to-nose coordination is intact. Gait deferred.     DIAGNOSTIC DATA:   Lab Results   Compon

## 2021-09-17 NOTE — H&P
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  LIMA to LAD, SVG to D1, SVG to D2   • CABG     • COLONOSCOPY  2015    Dr. Kenya Del Toro, at [facility], polyps otherwise negative.     • OTHER SURGICAL HISTORY  2009    right index Take 1 tablet (25 mg total) by mouth 3 (three) times daily as needed. , Disp: 15 tablet, Rfl: 0  warfarin 5 MG Oral Tab, Take 5 mg by mouth 4 (four) times a week.  Sat-Mon-Wed-Fri, Disp: , Rfl:   lisinopril 10 MG Oral Tab, Take 1 tablet (10 mg total) by mout 09/16/2021     09/16/2021    K 3.2 09/16/2021     09/16/2021    CO2 29.0 09/16/2021     09/16/2021    CA 8.4 09/16/2021    ALB 3.5 09/16/2021    ALKPHO 52 09/16/2021    BILT 0.8 09/16/2021    TP 7.1 09/16/2021    AST 19 09/16/2021    ALT (QQF=51217)    Result Date: 9/17/2021  PROCEDURE:  CT ANGIOGRAPHY, CHEST (CPT=71275)  COMPARISON:  [facility] , CT, CT CHEST PE AORTA (IV ONLY) (CPT=71260), 7/21/2021, 6:09 PM.  INDICATIONS:  syncope, fall-blood thinner  TECHNIQUE:  IV contrast-enhanced multis skull base through C7. Multiplanar reconstructions are generated. Dose reduction techniques were used.  Dose information is transmitted to the [facility] [employer] of Radiology) NRDR ([address]) which includes the Dose Index Regist HTN, HLD, hypothyroidism, depression, and anxiety who presented to the ED for further evaluation after he was found unresponsive in his garage.      # Acute intracranial hemorrhage   - suspect traumatic   - appreciate NSGY eval   - keep BP below 150   - pancho

## 2021-09-17 NOTE — PROCEDURES
Date of Procedure: 9/17/2021    Procedure: EEG (ELECTROENCEPHALOGRAM)     DX: SYNCOPE  HX: PT IS A 77 YO MALE WHO PRESENTED TO THE ED ON 9/16/2021 FOR SYNCOPE.   PMH: ANXIETY, A-FIB, CHD, CAD, HEART ATTACK, HEART MURMUR, HIGH BP, HX BLOOD CLOTS, HLD, HTN,

## 2021-09-17 NOTE — SLP NOTE
ADULT SWALLOWING EVALUATION & Cognitive Communication Screening    ASSESSMENT    ASSESSMENT/OVERALL IMPRESSION:  Order received as per protocol; Chart reviewed.   Pt independent at baseline however presented yesterday after being found at home on the floor, • History of cardiac arrest 2006   • History of DVT of lower extremity     April 2014   • Hypertension 4/17/2014   • Hyperthyroidism    • Migraines    • Other and unspecified hyperlipidemia    • Pneumonia, organism unspecified(486)    • Psychiatric diso [address]

## 2021-09-17 NOTE — OCCUPATIONAL THERAPY NOTE
OCCUPATIONAL THERAPY QUICK EVALUATION - INPATIENT    Room Number: 8321/5641-N  Evaluation Date: 9/17/2021     Type of Evaluation: Initial  Presenting Problem: Syncope and collapse     Physician Order: IP Consult to Occupational Therapy  Reason for Therapy: OTHER SURGICAL HISTORY  2009    right index finger sepsis s/p wart removal    • OTHER SURGICAL HISTORY  10/20/2020    Cystoscopy Dr. Chelsea Sanz   • Brian Farooq SITUATION  Type of Home: House  Home Layout: One level (Has st ASSESSMENT  Supine to Sit : Independent  Sit to Stand: Independent    Skilled Therapy Provided:     Pt received seated in chair.      Transfers  Supine to EOB: IND  Sit to Stand - EOB: IND  Sit to Stand - Chair: IND  Sit to Stand - Toilet: not tested  EOB t during this admission.     Patient was able to achieve the following goals:  Patient able to toilet transfer: at previous functional level  Patient able to dress lower extremities: at previous functional level  Patient/Caregiver able to demonstrate safety w

## 2021-09-17 NOTE — CM/SW NOTE
MDO: [facility] afia  MSW reviewed chart, PT rec is home. MSW spoke to Rn- No dc today, still on Keppra. Family at bedside. Social work will continue to follow.

## 2021-09-17 NOTE — PROGRESS NOTES
[facility]ART[facility]  Neurocritical Care APRN Progress Note    NAME: Prachi Ball - ROOM: 5994/6597-I - MRN: [id number] - Age: 78year old - :1945    History Of Present Illness:  Prachi Ball is a 78year old male with PMHx sig Pulmonary embolism ([facility] [address]     2008, 2010, 3/2014   • Stroke (cerebrum) [facility])     residual weakness left side   • Unspecified essential hypertension    • Unspecified sleep apnea SPLIT NIGHT 12-14-14    AHI 38 RDI 38 SaO2 reza 85 % CPAP 6 [employer]   • Visual equally round and reactive to light. 3+ brisk bilaterally. EOMs intact. Visual fields are full. Tongue is midline. Face is symmetrical. Uvula and palate elevate symmetrically. Shrug shoulders normal bilaterally.   The rest of the cranial nerves are chavo 09/16/2021    AST 19 09/16/2021    ALT 26 09/16/2021    PTT 34.0 09/16/2021    INR 1.56 09/16/2021       Assessment/Plan:  1.  ICH  -ICH order set  -Neuro checks Q1h  -Maintain SBP between 100-150 mmHg  -Labetalol 10mg IV PRN, Hydralazine 10 mg IV PRN, [place]

## 2021-09-17 NOTE — ED QUICK NOTES
Complained of mid sternal chest discomfort & neck/back pain. Dr Lavenia Barthel made aware. CT of brain with possible brain bleed.  No neuro deficits noted

## 2021-09-17 NOTE — ED PROVIDER NOTES
Patient Seen in: [facility] Emergency Department      History   Patient presents with:  Fall    Stated Complaint: syncope    Subjective:   HPI    Went to take out the garbage and went down 2 steps- states he woke up confused next to the car in the g otherwise negative.     • OTHER SURGICAL HISTORY  2009    right index finger sepsis s/p wart removal    • OTHER SURGICAL HISTORY  10/20/2020    Cystoscopy Dr. Sydnee Zuniga   • TONSILLECTOMY                  Social History    Tobacco Use      Smoking status: Former sternotomy incision  Pulmonary:      Effort: Pulmonary effort is normal. No respiratory distress. Breath sounds: Normal breath sounds. No stridor. No wheezing. Chest:      Chest wall: No tenderness.    Abdominal:      General: Bowel sounds are normal (*)     INR 1.33 (*)     All other components within normal limits   URINALYSIS WITH CULTURE REFLEX - Abnormal; Notable for the following components:    Spec Gravity >1.030 (*)     All other components within normal limits   BASIC METABOLIC PANEL (8) - Abn result                 Please view results for these tests on the individual orders. CBC WITH DIFFERENTIAL WITH PLATELET    Narrative: The following orders were created for panel order CBC With Differential With Platelet.   Procedure transcribed by Technologist)  Intra cranial     hemorrhage. Interval exam.          FINDINGS:    Ventricles and sulci are within normal limits. There is no midline shift     or mass-effect. The basal cisterns are patent.   The gray-white matter     differ Incidental bibasilar atelectatic     changes. MEDIASTINUM:  Stable low-density nodule involving the left thyroid lobe,     unchanged from prior imaging    BILLIE:  No mass or adenopathy. CARDIAC:  There is cardiomegaly noted.   There are changes of pr posterior to C4, C5-C6 and T1 spinous processes.   There is uncinate     hypertrophy left greater than right     at C2-3, C3-4, C4-5 and C5-6 levels with neural foraminal narrowing most     severe at C3-4 on the left.                               ===== phoned to Dr. Bakari Rodriguez on 9/16/2021 at 2358 hours     with read back.               Dictated by (CST): Karen Contreras MD on 9/16/2021 at 11:55 PM         Finalized by (CST): Karen Contreras MD on 9/16/2021 at 11:58 PM        XR CHEST AP PORTABLE  (CPT=71045 caused the syncope. All of this will require further investigation he has a significant past medical history has had coronary artery bypass graft, prior MI, but he is actually really active individual at baseline.   Discussed all of this to the best my kno

## 2021-09-17 NOTE — CONSULTS
[facility]  Report of Consultation    Dede Leal Patient Status:  Observation    1945 MRN [id number]   [facility] 6NE-A Attending Angel Chandra, DO   Hosp Day # 0 PCP Mike Dupree MD     Reason for C blood clots    • History of cardiac arrest 2006   • History of DVT of lower extremity     April 2014   • Hypertension 4/17/2014   • Hyperthyroidism    • Migraines    • Other and unspecified hyperlipidemia    • Pneumonia, organism unspecified(486)    • Psyc BY MOUTH ONCE DAILY, Disp: 180 tablet, Rfl: 2  METOPROLOL SUCCINATE ER 25 MG Oral Tablet 24 Hr, TAKE ONE TABLET BY MOUTH ONCE DAILY , Disp: 90 tablet, Rfl: 2  HYDROCHLOROTHIAZIDE 25 MG Oral Tab, TAKE ONE TABLET BY MOUTH ONCE DAILY , Disp: 90 tablet, Rfl: 2 movement is noted throughout all lung fields. Abdomen: Soft. Non-distended. Non-tender. Bowel sounds are present and normoactive. No guarding or rebound. Extremities: Extremities do not demonstrate any evidence of peripheral edema.    No cyanosis or evaluation  7. Neurosurgery following  8. Pending above data will consider EP / ischemic evalaution  9. Maintain lytes WNL  10. ASA / Coumadin on hold. Given recurrent DVT / PE will need to consider resuming anticoagulant once stable neurologically.

## 2021-09-17 NOTE — ED INITIAL ASSESSMENT (HPI)
Pt here due to syncopal episode while taking off trash, + LOC x 2 mins. Alert & coherent with dizziness, + lac to nosebridge.  On Coumadin therapy, recently seen Cardiologist.

## 2021-09-17 NOTE — CONSULTS
[facility]  Neurocritical Care Consult Note    Rosa Barrera Patient Status:  Observation    1945 MRN [id number]   [facility] 6NE-A Attending Grace Wagoner, DO   Hosp Day # 0 PCP Rober Bryan MD       Re COLONOSCOPY  2015    Dr. Isa Booth, at [facility], polyps otherwise negative.     • OTHER SURGICAL HISTORY  2009    right index finger sepsis s/p wart removal    • OTHER SURGICAL HISTORY  10/20/2020    Cystoscopy Dr. Velia Weathers   • TONSILLECTOMY         warfarin 5 MG Oral time  warfarin 5 MG Oral Tab, Take 5 mg by mouth 4 (four) times a week. Sat-Mon-Wed-Fri, Disp: , Rfl: , 9/15/2021  lisinopril 10 MG Oral Tab, Take 1 tablet (10 mg total) by mouth daily. , Disp: 90 tablet, Rfl: 3        Demerol [Meperidine*    OTHER (SEE COM injection 10 mg, 10 mg, Intravenous, Q8H PRN  LORazepam (ATIVAN) injection 1 mg, 1 mg, Intravenous, Q15 Min PRN  ALPRAZolam (XANAX) tab 0.25 mg, 0.25 mg, Oral, TID PRN  atorvastatin (LIPITOR) tab 40 mg, 40 mg, Oral, Daily  famotidine (PEPCID) tab 40 mg, 40 hemorrhage along the paramedian right frontal lobe as above.    Dictated by (CST): Ronak Mitchell MD on 9/17/2021 at 10:08 AM     Finalized by (CST): Ronak Mitchell MD on 9/17/2021 at 10:10 AM       CT BRAIN OR HEAD (64801)    Result Date: 9/16/2021  CONC Assesment/Plan:     Neuro:  Traumatic R frontal sah- likely 2/2 syncope/fall and head trauma.  Rpt ct and exam remain stable, hold oac x5d per NS, cont neurochecks/pt/ot    Syncope- given h/o childhood epilepsy will check eeg, vs cardiac etiology give

## 2021-09-18 VITALS
WEIGHT: 253 LBS | BODY MASS INDEX: 32.47 KG/M2 | RESPIRATION RATE: 18 BRPM | SYSTOLIC BLOOD PRESSURE: 116 MMHG | TEMPERATURE: 98 F | HEART RATE: 57 BPM | DIASTOLIC BLOOD PRESSURE: 64 MMHG | HEIGHT: 74 IN | OXYGEN SATURATION: 97 %

## 2021-09-18 LAB
ANION GAP SERPL CALC-SCNC: 4 MMOL/L (ref 0–18)
BLOOD TYPE BARCODE: 6200
BLOOD TYPE BARCODE: 6200
BUN BLD-MCNC: 15 MG/DL (ref 7–18)
CALCIUM BLD-MCNC: 8.7 MG/DL (ref 8.5–10.1)
CHLORIDE SERPL-SCNC: 109 MMOL/L (ref 98–112)
CO2 SERPL-SCNC: 28 MMOL/L (ref 21–32)
CREAT BLD-MCNC: 0.69 MG/DL
GLUCOSE BLD-MCNC: 111 MG/DL (ref 70–99)
GLUCOSE BLD-MCNC: 114 MG/DL (ref 70–99)
GLUCOSE BLD-MCNC: 118 MG/DL (ref 70–99)
INR BLD: 1.19 (ref 0.89–1.11)
MAGNESIUM SERPL-MCNC: 2.3 MG/DL (ref 1.6–2.6)
OSMOLALITY SERPL CALC.SUM OF ELEC: 294 MOSM/KG (ref 275–295)
POTASSIUM SERPL-SCNC: 3.3 MMOL/L (ref 3.5–5.1)
POTASSIUM SERPL-SCNC: 4.1 MMOL/L (ref 3.5–5.1)
PROTHROMBIN TIME: 15.4 SECONDS (ref 12.2–14.5)
SODIUM SERPL-SCNC: 141 MMOL/L (ref 136–145)

## 2021-09-18 PROCEDURE — 85610 PROTHROMBIN TIME: CPT | Performed by: INTERNAL MEDICINE

## 2021-09-18 PROCEDURE — 84132 ASSAY OF SERUM POTASSIUM: CPT | Performed by: INTERNAL MEDICINE

## 2021-09-18 PROCEDURE — 82962 GLUCOSE BLOOD TEST: CPT

## 2021-09-18 PROCEDURE — 80048 BASIC METABOLIC PNL TOTAL CA: CPT | Performed by: INTERNAL MEDICINE

## 2021-09-18 PROCEDURE — 83735 ASSAY OF MAGNESIUM: CPT | Performed by: INTERNAL MEDICINE

## 2021-09-18 RX ORDER — LEVETIRACETAM 1000 MG/1
1000 TABLET ORAL 2 TIMES DAILY
Qty: 60 TABLET | Refills: 1 | Status: SHIPPED | OUTPATIENT
Start: 2021-09-18 | End: 2021-12-02

## 2021-09-18 RX ORDER — POTASSIUM CHLORIDE 20 MEQ/1
40 TABLET, EXTENDED RELEASE ORAL EVERY 4 HOURS
Status: COMPLETED | OUTPATIENT
Start: 2021-09-18 | End: 2021-09-18

## 2021-09-18 NOTE — DISCHARGE SUMMARY
General Medicine Discharge Summary     Patient ID:  Lupillo Fletcher  78year old  6/29/1945    Admit date: 9/16/2021    Discharge date and time: 9/18/2021    Attending Physician: ZAHRA Yañez TO NEUROLOGY  IP CONSULT TO NEUROSURGERY  IP CONSULT TO NEUROLOGY  IP CONSULT TO SOCIAL WORK  IP CONSULT TO CARDIAC REHAB    Operative Procedures:  None      Patient instructions:      I as the attending physician reconciled the current and discharge medic °F (36.8 °C)       Gen: No acute distress, alert and oriented x3, no focal neurologic deficits  HEENT:  EOMI, PERRLA, OP clear, MMM  Pulm: Lungs clear bilaterally, normal respiratory effort  CV: Heart with regular rate and rhythm, no murmur. Normal PMI.

## 2021-09-18 NOTE — PLAN OF CARE
Received patient, alert and oriented. Denied chest pain, denied SOB. Refused to wear CPAP. Discussed POC. Due meds given. Safety measures reinforced, call light within reach. Needs attended to. Will continue to monitor.     Problem: Patient/Family Goals  Go respiratory status  - Assess for changes in mentation and behavior  - Position to facilitate oxygenation and minimize respiratory effort  - Oxygen supplementation based on oxygen saturation or ABGs  - Provide Smoking Cessation handout, if applicable  - Enc and minimize risk of hemorrhage  - Monitor temperature, glucose, and sodium.  Initiate appropriate interventions as ordered  Outcome: Progressing  Goal: Absence of seizures  Description: INTERVENTIONS  - Monitor for seizure activity  - Administer anti-seizu

## 2021-09-18 NOTE — PLAN OF CARE
CNICU transfer 9/17/21 1944. Upon arrival patient is alert, oriented x4. PERRLA. Seizure precautions maintained, hx epilepsy (teenager), EEG w/low threshold, started on keppra - tolerating well. Glasses present on arrival. O2 sats > 94% on RA.  Lungs w/ezio medications as ordered  - Initiate emergency measures for life threatening arrhythmias  - Monitor electrolytes and administer replacement therapy as ordered  Outcome: Progressing     Problem: RESPIRATORY - ADULT  Goal: Achieves optimal ventilation and oxyg HEMATOLOGIC - ADULT  Goal: Maintains hematologic stability  Description: INTERVENTIONS  - Assess for signs and symptoms of bleeding or hemorrhage  - Monitor labs and vital signs for trends  - Administer supportive blood products/factors, fluids and medicat occurs, turn patient to side and suction secretions as needed  - Reorient patient post seizure  - Seizure pads on all 4 side rails  - Instruct patient/family to notify RN of any seizure activity  - Instruct patient/family to call for assistance with Demario Wells 72 y/o male with a hx of Cardiac Transplant x 10 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 History obtained from ED note overnight. Patient was well on 9/10/2018 am when his wife left him home at 7 am and gave him his breakfast. She called him from work later in the afternoon but no answer. Around 5:30 in the evening when she came home , she found him very confused and he was trying to catch things that were not there. No visible signs of trauma. Family called EMS.     As per pt, he was afebrile at home, no cough/fevers/chills. He states he is in the hospital for DKA and he has had it before. He denied any acute complaints at this time.    Upon presentation the ED he was hypertensive 173/115 s/p labetolol IV x 2, Glucose >600 with positive serum and urine ketones. The patient was put on an insulin drip and D5 1/2 NS.    CT Head on admission: Stable 0.9 x 0.5 cm hyperdensity in the left cerebellum, now with minimal surrounding edema may represent a vascular malformation such as a cavernoma which may have recently bled; no evidence of acute intracranial pathology or mass effect. Per Neurosurgery's recommendation, no surgical intervention was pursued. TTE w/ bubble study was done which revealed no shunt, though the study was of poor quality.    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    An MRI Brain w/ and w/o contrast on 9/14 revealed acute right frontal infarcts; tiny hemorrhage w/ surrounding edema in left cerebellum, possibly secondary to hypotensive and/or hypovolemic state prior to admission.    The patient was restarted on aspirin per Neurology's recommendation.    Urine culture revealed Burkholderia sp. and the patient started and completed an 8 day course of cefepime. Repeat urine cx on 8/16 again revealed Burkholderia sp.    Over the patient's hospital course, his plasma glucose gradually decreased to close to the normal range. His insulin drip was discontinued and lispro and Lantus were titrated up to contain plasma glucose to within near-appropriate range. He was counseled regarding appropriate management of his diabetes both with medication and through optimized diet, and was advised to follow up with a diabetes management program upon discharge from hospital. He was stable for discharge to home with outpatient VNS. 70 y/o male with a hx of Cardiac Transplant x 10 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 History obtained from ED note overnight. Patient was well on 9/10/2018 am when his wife left him home at 7 am and gave him his breakfast. She called him from work later in the afternoon but no answer. Around 5:30 in the evening when she came home , she found him very confused and he was trying to catch things that were not there. No visible signs of trauma. Family called EMS.     As per pt, he was afebrile at home, no cough/fevers/chills. He states he is in the hospital for DKA and he has had it before. He denied any acute complaints at this time.    Upon presentation the ED he was hypertensive 173/115 s/p labetolol IV x 2, Glucose >600 with positive serum and urine ketones. The patient was put on an insulin drip and D5 1/2 NS.    CT Head on admission: Stable 0.9 x 0.5 cm hyperdensity in the left cerebellum, now with minimal surrounding edema may represent a vascular malformation such as a cavernoma which may have recently bled; no evidence of acute intracranial pathology or mass effect. Per Neurosurgery's recommendation, no surgical intervention was pursued. TTE w/ bubble study was done which revealed no shunt, though the study was of poor quality.    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    An MRI Brain w/ and w/o contrast on 9/14 revealed acute right frontal infarcts; tiny hemorrhage w/ surrounding edema in left cerebellum, possibly secondary to hypotensive and/or hypovolemic state prior to admission.    The patient was restarted on aspirin per Neurology's recommendation.    Urine culture revealed Burkholderia sp. and the patient started and completed an 8 day course of cefepime. Repeat urine cx on 8/16 again revealed Burkholderia sp.    Over the patient's hospital course, his plasma glucose gradually decreased to close to the normal range. His insulin drip was discontinued and lispro and Lantus were titrated up to contain plasma glucose to within near-appropriate range.    The patient had been complaining of dysuria and "pressure" on urination, despite doxazosin treatment. Finasteride was added to his regimen per Dr. Paulino.    He was counseled regarding appropriate management of his diabetes both with medication and through optimized diet, and was advised to follow up with a diabetes management program upon discharge from hospital, as well as a neurologist, cardiologist, endocrinologist, and urologist. He was stable for discharge to home with outpatient VNS for physical therapy.

## 2021-09-18 NOTE — PLAN OF CARE
Pt is very eager for discharge. Pt has now been cleared by all specialists for dc. Dr. Partha Adkins contacted for script for [address]. Pt has no neuro deficits and NIH score 0. No pain, no complaints.  DC instructions thoroughly reviewed with pt including medicat

## 2021-09-18 NOTE — PROGRESS NOTES
[facility] Cardiology Progress Note        Brent Campbell Patient Status:  Observation    1945 MRN [id number]   [facility] 8NE-A Attending Anita Barry DO   Hosp Day # 0 PCP MD Shadia Laurent ventricle: The cavity size was normal. Systolic function was      normal.   3. Left atrium: The left atrial volume was mildly increased. 4. Aortic valve: Thickening, consistent with sclerosis. 5. Aorta: Aortic root was 4.1cm at the sinus of Valsalva. JOSÉ MANUEL           Plan:  1. Keep off beta-blocker  2. Tele monitoring ok thus far  3. Coumadin reversed  5. Echo stable  6. Neuro evaluated  7. Neurosurgery following  8. ASA / Coumadin on hold for 5 day per NSG given ICH.     9. OK for DC from CV viewpo

## 2021-09-18 NOTE — PROGRESS NOTES
INPATIENT NEUROLOGY PROGRESS NOTE    Date: 9/18/2021    Randy Wells is a 78year old male at Hospital Day ( LOS: 0 days )    Assessment:   Overall condition:  stable   Principal Problem:    Syncope and collapse  Active Problems:     Thrombocytopenia tab 1,000 mg, 1,000 mg, Oral, BID         Objective:   Physical Exam:  /64 (BP Location: Right arm)   Pulse 57   Temp 98.3 °F (36.8 °C) (Oral)   Resp 18   Ht 74\"   Wt 253 lb (114.8 kg)   SpO2 97%   BMI 32.48 kg/m²   Neurological Examination:  Mental

## 2021-09-29 PROBLEM — R00.1 SINUS BRADYCARDIA: Status: ACTIVE | Noted: 2021-09-29

## 2021-09-29 PROBLEM — I25.5 CARDIOMYOPATHY, ISCHEMIC: Status: ACTIVE | Noted: 2021-09-29

## 2021-11-29 PROBLEM — I77.810 DILATED AORTIC ROOT (HCC): Status: ACTIVE | Noted: 2021-11-29

## 2021-12-01 RX ORDER — LEVETIRACETAM 1000 MG/1
TABLET ORAL
Qty: 60 TABLET | Refills: 0 | OUTPATIENT
Start: 2021-12-01

## 2021-12-02 RX ORDER — LEVETIRACETAM 1000 MG/1
TABLET ORAL
Qty: 180 TABLET | Refills: 1 | Status: SHIPPED | OUTPATIENT
Start: 2021-12-02 | End: 2021-12-02

## 2021-12-02 RX ORDER — LEVETIRACETAM 1000 MG/1
1000 TABLET ORAL 2 TIMES DAILY
Qty: 4 TABLET | Refills: 0 | Status: SHIPPED | OUTPATIENT
Start: 2021-12-02

## 2021-12-02 NOTE — TELEPHONE ENCOUNTER
Requested Prescriptions     Pending Prescriptions Disp Refills   • LEVETIRACETAM 1000 MG Oral Tab [Pharmacy Med Name: Levetiracetam 1,000 Mg Tab Nort] 60 tablet 0     Sig: TAKE ONE TABLET  [id number] - [facility]

## 2022-01-02 PROBLEM — I50.22 CHRONIC SYSTOLIC CHF (CONGESTIVE HEART FAILURE) (HCC): Status: ACTIVE | Noted: 2022-01-02

## 2022-01-02 PROBLEM — F33.42 RECURRENT MAJOR DEPRESSIVE DISORDER, IN FULL REMISSION (HCC): Status: ACTIVE | Noted: 2022-01-02

## 2022-01-02 PROBLEM — I48.20 CHRONIC ATRIAL FIBRILLATION, UNSPECIFIED (HCC): Status: ACTIVE | Noted: 2022-01-02

## 2022-01-19 PROBLEM — Z87.898 HX OF SYNCOPE: Status: ACTIVE | Noted: 2022-01-19

## 2022-01-19 PROBLEM — R26.89 BALANCE PROBLEM: Status: ACTIVE | Noted: 2022-01-19

## 2022-01-19 PROBLEM — Z91.81 HX OF FALL: Status: ACTIVE | Noted: 2022-01-19

## 2022-01-19 PROBLEM — Z86.79 HISTORY OF SUBARACHNOID HEMORRHAGE: Status: ACTIVE | Noted: 2022-01-19

## 2022-01-26 PROBLEM — Z86.711 HISTORY OF PULMONARY EMBOLISM: Status: ACTIVE | Noted: 2022-01-26

## 2022-04-01 PROBLEM — I25.5 CARDIOMYOPATHY, ISCHEMIC: Status: ACTIVE | Noted: 2022-04-01

## 2022-06-30 ENCOUNTER — HOSPITAL ENCOUNTER (EMERGENCY)
Facility: HOSPITAL | Age: 77
Discharge: HOME | End: 2022-07-01
Attending: EMERGENCY MEDICINE
Payer: MEDICARE

## 2022-06-30 DIAGNOSIS — R42 LIGHTHEADEDNESS: ICD-10-CM
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
## 2022-06-30 LAB
ALBUMIN SERPL-MCNC: 3.3 G/DL (ref 3.4–5)
ALBUMIN/GLOB SERPL: 1.1 {RATIO} (ref 1–2)
ALP LIVER SERPL-CCNC: 59 U/L
ALT SERPL-CCNC: 20 U/L
ANION GAP SERPL CALC-SCNC: 7 MMOL/L (ref 0–18)
AST SERPL-CCNC: 20 U/L (ref 15–37)
BASOPHILS # BLD AUTO: 0.02 X10(3) UL (ref 0–0.2)
BASOPHILS NFR BLD AUTO: 0.4 %
BILIRUB SERPL-MCNC: 0.5 MG/DL (ref 0.1–2)
BUN BLD-MCNC: 24 MG/DL (ref 7–18)
CALCIUM BLD-MCNC: 8.3 MG/DL (ref 8.5–10.1)
CHLORIDE SERPL-SCNC: 110 MMOL/L (ref 98–112)
CO2 SERPL-SCNC: 23 MMOL/L (ref 21–32)
CREAT BLD-MCNC: 1.2 MG/DL
EOSINOPHIL # BLD AUTO: 0.05 X10(3) UL (ref 0–0.7)
EOSINOPHIL NFR BLD AUTO: 0.9 %
ERYTHROCYTE [DISTWIDTH] IN BLOOD BY AUTOMATED COUNT: 13.3 %
GLOBULIN PLAS-MCNC: 3.1 G/DL (ref 2.8–4.4)
GLUCOSE BLD-MCNC: 113 MG/DL (ref 70–99)
HCT VFR BLD AUTO: 38.6 %
HGB BLD-MCNC: 12.3 G/DL
IMM GRANULOCYTES # BLD AUTO: 0.03 X10(3) UL (ref 0–1)
IMM GRANULOCYTES NFR BLD: 0.6 %
INR BLD: 3.2 (ref 0.8–1.2)
LYMPHOCYTES # BLD AUTO: 1.08 X10(3) UL (ref 1–4)
LYMPHOCYTES NFR BLD AUTO: 20.1 %
MCH RBC QN AUTO: 28.3 PG (ref 26–34)
MCHC RBC AUTO-ENTMCNC: 31.9 G/DL (ref 31–37)
MCV RBC AUTO: 88.7 FL
MONOCYTES # BLD AUTO: 0.63 X10(3) UL (ref 0.1–1)
MONOCYTES NFR BLD AUTO: 11.8 %
NEUTROPHILS # BLD AUTO: 3.55 X10 (3) UL (ref 1.5–7.7)
NEUTROPHILS # BLD AUTO: 3.55 X10(3) UL (ref 1.5–7.7)
NEUTROPHILS NFR BLD AUTO: 66.2 %
OSMOLALITY SERPL CALC.SUM OF ELEC: 295 MOSM/KG (ref 275–295)
PLATELET # BLD AUTO: 140 10(3)UL (ref 150–450)
POTASSIUM SERPL-SCNC: 4.6 MMOL/L (ref 3.5–5.1)
PROT SERPL-MCNC: 6.4 G/DL (ref 6.4–8.2)
PROTHROMBIN TIME: 33 SECONDS (ref 11.6–14.8)
RBC # BLD AUTO: 4.35 X10(6)UL
SODIUM SERPL-SCNC: 140 MMOL/L (ref 136–145)
WBC # BLD AUTO: 5.4 X10(3) UL (ref 4–11)

## 2022-06-30 PROCEDURE — 96360 HYDRATION IV INFUSION INIT: CPT

## 2022-06-30 PROCEDURE — 85025 COMPLETE CBC W/AUTO DIFF WBC: CPT | Performed by: EMERGENCY MEDICINE

## 2022-06-30 PROCEDURE — 80053 COMPREHEN METABOLIC PANEL: CPT | Performed by: EMERGENCY MEDICINE

## 2022-06-30 PROCEDURE — 85610 PROTHROMBIN TIME: CPT | Performed by: EMERGENCY MEDICINE

## 2022-06-30 PROCEDURE — 96361 HYDRATE IV INFUSION ADD-ON: CPT

## 2022-06-30 PROCEDURE — 99285 EMERGENCY DEPT VISIT HI MDM: CPT

## 2022-06-30 PROCEDURE — 93005 ELECTROCARDIOGRAM TRACING: CPT

## 2022-06-30 PROCEDURE — 93010 ELECTROCARDIOGRAM REPORT: CPT

## 2022-07-01 ENCOUNTER — APPOINTMENT (OUTPATIENT)
Dept: CT IMAGING | Facility: HOSPITAL | Age: 77
End: 2022-07-01
Attending: EMERGENCY MEDICINE
Payer: MEDICARE

## 2022-07-01 VITALS
SYSTOLIC BLOOD PRESSURE: 90 MMHG | WEIGHT: 235 LBS | HEIGHT: 74 IN | TEMPERATURE: 99 F | DIASTOLIC BLOOD PRESSURE: 65 MMHG | RESPIRATION RATE: 18 BRPM | HEART RATE: 72 BPM | BODY MASS INDEX: 30.16 KG/M2 | OXYGEN SATURATION: 95 %

## 2022-07-01 LAB
ATRIAL RATE: 71 BPM
P AXIS: 67 DEGREES
P-R INTERVAL: 256 MS
Q-T INTERVAL: 412 MS
QRS DURATION: 146 MS
QTC CALCULATION (BEZET): 447 MS
R AXIS: 98 DEGREES
T AXIS: 90 DEGREES
VENTRICULAR RATE: 71 BPM

## 2022-07-01 PROCEDURE — 72125 CT NECK SPINE W/O DYE: CPT | Performed by: EMERGENCY MEDICINE

## 2022-07-01 PROCEDURE — 74177 CT ABD & PELVIS W/CONTRAST: CPT | Performed by: EMERGENCY MEDICINE

## 2022-07-01 PROCEDURE — 70450 CT HEAD/BRAIN W/O DYE: CPT | Performed by: EMERGENCY MEDICINE

## 2022-07-01 RX ORDER — ACETAMINOPHEN 500 MG
1000 TABLET ORAL ONCE
Status: COMPLETED | OUTPATIENT
Start: 2022-07-01 | End: 2022-07-01

## 2022-07-01 RX ORDER — IOHEXOL 350 MG/ML
100 INJECTION, SOLUTION INTRAVENOUS
Status: COMPLETED | OUTPATIENT
Start: 2022-07-01 | End: 2022-07-01

## 2022-07-01 RX ORDER — SODIUM CHLORIDE 9 MG/ML
INJECTION, SOLUTION INTRAVENOUS CONTINUOUS
Status: DISCONTINUED | OUTPATIENT
Start: 2022-07-01 | End: 2022-07-01

## 2022-07-01 NOTE — ED INITIAL ASSESSMENT (HPI)
PT was celebrating his birthday and had 1/2 glass of wine. PT was standing, got dizzy, fell, hitting head. Denies LOC. PT is on blood thinners.

## 2022-07-02 ENCOUNTER — HOSPITAL ENCOUNTER (EMERGENCY)
Facility: HOSPITAL | Age: 77
Discharge: HOME | End: 2022-07-02
Attending: EMERGENCY MEDICINE
Payer: MEDICARE

## 2022-07-02 ENCOUNTER — APPOINTMENT (OUTPATIENT)
Dept: CT IMAGING | Facility: HOSPITAL | Age: 77
End: 2022-07-02
Attending: EMERGENCY MEDICINE
Payer: MEDICARE

## 2022-07-02 ENCOUNTER — APPOINTMENT (OUTPATIENT)
Dept: GENERAL RADIOLOGY | Facility: HOSPITAL | Age: 77
End: 2022-07-02
Attending: EMERGENCY MEDICINE
Payer: MEDICARE

## 2022-07-02 VITALS
TEMPERATURE: 98 F | OXYGEN SATURATION: 96 % | WEIGHT: 235 LBS | RESPIRATION RATE: 19 BRPM | DIASTOLIC BLOOD PRESSURE: 76 MMHG | SYSTOLIC BLOOD PRESSURE: 141 MMHG | BODY MASS INDEX: 30 KG/M2 | HEART RATE: 64 BPM

## 2022-07-02 DIAGNOSIS — R53.83 FATIGUE, UNSPECIFIED TYPE: Primary | ICD-10-CM

## 2022-07-02 LAB
ALBUMIN SERPL-MCNC: 3.5 G/DL (ref 3.4–5)
ALBUMIN/GLOB SERPL: 1 {RATIO} (ref 1–2)
ALP LIVER SERPL-CCNC: 67 U/L
ALT SERPL-CCNC: 19 U/L
ANION GAP SERPL CALC-SCNC: 5 MMOL/L (ref 0–18)
AST SERPL-CCNC: 8 U/L (ref 15–37)
ATRIAL RATE: 67 BPM
BASOPHILS # BLD AUTO: 0.02 X10(3) UL (ref 0–0.2)
BASOPHILS NFR BLD AUTO: 0.4 %
BILIRUB SERPL-MCNC: 0.6 MG/DL (ref 0.1–2)
BILIRUB UR QL STRIP.AUTO: NEGATIVE
BUN BLD-MCNC: 19 MG/DL (ref 7–18)
CALCIUM BLD-MCNC: 8.6 MG/DL (ref 8.5–10.1)
CHLORIDE SERPL-SCNC: 109 MMOL/L (ref 98–112)
CLARITY UR REFRACT.AUTO: CLEAR
CO2 SERPL-SCNC: 27 MMOL/L (ref 21–32)
COLOR UR AUTO: YELLOW
CREAT BLD-MCNC: 0.98 MG/DL
EOSINOPHIL # BLD AUTO: 0.09 X10(3) UL (ref 0–0.7)
EOSINOPHIL NFR BLD AUTO: 1.7 %
ERYTHROCYTE [DISTWIDTH] IN BLOOD BY AUTOMATED COUNT: 13.3 %
GLOBULIN PLAS-MCNC: 3.5 G/DL (ref 2.8–4.4)
GLUCOSE BLD-MCNC: 110 MG/DL (ref 70–99)
GLUCOSE UR STRIP.AUTO-MCNC: NEGATIVE MG/DL
HCT VFR BLD AUTO: 39.6 %
HGB BLD-MCNC: 12.6 G/DL
IMM GRANULOCYTES # BLD AUTO: 0.02 X10(3) UL (ref 0–1)
IMM GRANULOCYTES NFR BLD: 0.4 %
INR BLD: 3.65 (ref 0.8–1.2)
KETONES UR STRIP.AUTO-MCNC: NEGATIVE MG/DL
LYMPHOCYTES # BLD AUTO: 0.56 X10(3) UL (ref 1–4)
LYMPHOCYTES NFR BLD AUTO: 10.7 %
MCH RBC QN AUTO: 28.3 PG (ref 26–34)
MCHC RBC AUTO-ENTMCNC: 31.8 G/DL (ref 31–37)
MCV RBC AUTO: 88.8 FL
MONOCYTES # BLD AUTO: 0.5 X10(3) UL (ref 0.1–1)
MONOCYTES NFR BLD AUTO: 9.6 %
NEUTROPHILS # BLD AUTO: 4.04 X10 (3) UL (ref 1.5–7.7)
NEUTROPHILS # BLD AUTO: 4.04 X10(3) UL (ref 1.5–7.7)
NEUTROPHILS NFR BLD AUTO: 77.2 %
NITRITE UR QL STRIP.AUTO: NEGATIVE
OSMOLALITY SERPL CALC.SUM OF ELEC: 295 MOSM/KG (ref 275–295)
P AXIS: 40 DEGREES
P-R INTERVAL: 244 MS
PH UR STRIP.AUTO: 5 [PH] (ref 5–8)
PLATELET # BLD AUTO: 137 10(3)UL (ref 150–450)
POTASSIUM SERPL-SCNC: 4.5 MMOL/L (ref 3.5–5.1)
PROT SERPL-MCNC: 7 G/DL (ref 6.4–8.2)
PROT UR STRIP.AUTO-MCNC: NEGATIVE MG/DL
PROTHROMBIN TIME: 36.6 SECONDS (ref 11.6–14.8)
Q-T INTERVAL: 406 MS
QRS DURATION: 136 MS
QTC CALCULATION (BEZET): 429 MS
R AXIS: 84 DEGREES
RBC # BLD AUTO: 4.46 X10(6)UL
RBC UR QL AUTO: NEGATIVE
SODIUM SERPL-SCNC: 141 MMOL/L (ref 136–145)
SP GR UR STRIP.AUTO: 1.02 (ref 1–1.03)
T AXIS: 77 DEGREES
TROPONIN I HIGH SENSITIVITY: 21 NG/L
UROBILINOGEN UR STRIP.AUTO-MCNC: <2 MG/DL
VENTRICULAR RATE: 67 BPM
WBC # BLD AUTO: 5.2 X10(3) UL (ref 4–11)

## 2022-07-02 PROCEDURE — 85025 COMPLETE CBC W/AUTO DIFF WBC: CPT | Performed by: EMERGENCY MEDICINE

## 2022-07-02 PROCEDURE — 81001 URINALYSIS AUTO W/SCOPE: CPT | Performed by: EMERGENCY MEDICINE

## 2022-07-02 PROCEDURE — 70450 CT HEAD/BRAIN W/O DYE: CPT | Performed by: EMERGENCY MEDICINE

## 2022-07-02 PROCEDURE — 71045 X-RAY EXAM CHEST 1 VIEW: CPT | Performed by: EMERGENCY MEDICINE

## 2022-07-02 PROCEDURE — 93005 ELECTROCARDIOGRAM TRACING: CPT

## 2022-07-02 PROCEDURE — 80053 COMPREHEN METABOLIC PANEL: CPT | Performed by: EMERGENCY MEDICINE

## 2022-07-02 PROCEDURE — 93010 ELECTROCARDIOGRAM REPORT: CPT

## 2022-07-02 PROCEDURE — 85610 PROTHROMBIN TIME: CPT | Performed by: EMERGENCY MEDICINE

## 2022-07-02 PROCEDURE — 87086 URINE CULTURE/COLONY COUNT: CPT | Performed by: EMERGENCY MEDICINE

## 2022-07-02 PROCEDURE — 84484 ASSAY OF TROPONIN QUANT: CPT | Performed by: EMERGENCY MEDICINE

## 2022-07-02 PROCEDURE — 99285 EMERGENCY DEPT VISIT HI MDM: CPT

## 2022-07-02 PROCEDURE — 96360 HYDRATION IV INFUSION INIT: CPT

## 2022-07-02 PROCEDURE — 96361 HYDRATE IV INFUSION ADD-ON: CPT

## 2022-07-02 RX ORDER — ACETAMINOPHEN 500 MG
1000 TABLET ORAL ONCE
Status: COMPLETED | OUTPATIENT
Start: 2022-07-02 | End: 2022-07-02

## 2022-07-02 NOTE — ED INITIAL ASSESSMENT (HPI)
Pt to the emergency room for low BP for low BP, pt was seen at urgent care earlier today and was instructed to see ER for workup due to 99/51. Pt reports fatigue and dizziness.  Recent mechanical fall on 6/29 per patient, that started off his symptoms

## 2023-04-06 ENCOUNTER — HOSPITAL ENCOUNTER (INPATIENT)
Facility: HOSPITAL | Age: 78
LOS: 1 days | Discharge: HOME | End: 2023-04-07
Attending: EMERGENCY MEDICINE | Admitting: HOSPITALIST
Payer: MEDICARE

## 2023-04-06 DIAGNOSIS — Z79.01 ANTICOAGULATED ON WARFARIN: ICD-10-CM

## 2023-04-06 DIAGNOSIS — I82.442 ACUTE DVT OF LEFT TIBIAL VEIN (HCC): Primary | ICD-10-CM

## 2023-04-06 PROBLEM — R73.9 HYPERGLYCEMIA: Status: ACTIVE | Noted: 2023-04-06

## 2023-04-06 LAB
ALBUMIN SERPL-MCNC: 3.6 G/DL (ref 3.4–5)
ALBUMIN/GLOB SERPL: 1 {RATIO} (ref 1–2)
ALP LIVER SERPL-CCNC: 76 U/L
ALT SERPL-CCNC: 29 U/L
ANION GAP SERPL CALC-SCNC: 7 MMOL/L (ref 0–18)
APTT PPP: 34.6 SECONDS (ref 23.3–35.6)
AST SERPL-CCNC: 23 U/L (ref 15–37)
BASOPHILS # BLD AUTO: 0.03 X10(3) UL (ref 0–0.2)
BASOPHILS NFR BLD AUTO: 0.5 %
BILIRUB SERPL-MCNC: 0.8 MG/DL (ref 0.1–2)
BUN BLD-MCNC: 17 MG/DL (ref 7–18)
CALCIUM BLD-MCNC: 8.9 MG/DL (ref 8.5–10.1)
CHLORIDE SERPL-SCNC: 105 MMOL/L (ref 98–112)
CO2 SERPL-SCNC: 29 MMOL/L (ref 21–32)
CREAT BLD-MCNC: 0.94 MG/DL
EOSINOPHIL # BLD AUTO: 0.09 X10(3) UL (ref 0–0.7)
EOSINOPHIL NFR BLD AUTO: 1.5 %
ERYTHROCYTE [DISTWIDTH] IN BLOOD BY AUTOMATED COUNT: 14.9 %
GFR SERPLBLD BASED ON 1.73 SQ M-ARVRAT: 85 ML/MIN/1.73M2 (ref 60–?)
GLOBULIN PLAS-MCNC: 3.7 G/DL (ref 2.8–4.4)
GLUCOSE BLD-MCNC: 101 MG/DL (ref 70–99)
HCT VFR BLD AUTO: 43.6 %
HGB BLD-MCNC: 14.2 G/DL
IMM GRANULOCYTES # BLD AUTO: 0.01 X10(3) UL (ref 0–1)
IMM GRANULOCYTES NFR BLD: 0.2 %
INR BLD: 2.3 (ref 0.85–1.16)
LYMPHOCYTES # BLD AUTO: 0.95 X10(3) UL (ref 1–4)
LYMPHOCYTES NFR BLD AUTO: 16.3 %
MCH RBC QN AUTO: 27.8 PG (ref 26–34)
MCHC RBC AUTO-ENTMCNC: 32.6 G/DL (ref 31–37)
MCV RBC AUTO: 85.5 FL
MONOCYTES # BLD AUTO: 0.53 X10(3) UL (ref 0.1–1)
MONOCYTES NFR BLD AUTO: 9.1 %
NEUTROPHILS # BLD AUTO: 4.22 X10 (3) UL (ref 1.5–7.7)
NEUTROPHILS # BLD AUTO: 4.22 X10(3) UL (ref 1.5–7.7)
NEUTROPHILS NFR BLD AUTO: 72.4 %
OSMOLALITY SERPL CALC.SUM OF ELEC: 294 MOSM/KG (ref 275–295)
PLATELET # BLD AUTO: 168 10(3)UL (ref 150–450)
POTASSIUM SERPL-SCNC: 3.6 MMOL/L (ref 3.5–5.1)
PROT SERPL-MCNC: 7.3 G/DL (ref 6.4–8.2)
PROTHROMBIN TIME: 25.1 SECONDS (ref 11.6–14.8)
RBC # BLD AUTO: 5.1 X10(6)UL
SARS-COV-2 RNA RESP QL NAA+PROBE: NOT DETECTED
SODIUM SERPL-SCNC: 141 MMOL/L (ref 136–145)
WBC # BLD AUTO: 5.8 X10(3) UL (ref 4–11)

## 2023-04-06 PROCEDURE — 85610 PROTHROMBIN TIME: CPT | Performed by: EMERGENCY MEDICINE

## 2023-04-06 PROCEDURE — 99285 EMERGENCY DEPT VISIT HI MDM: CPT

## 2023-04-06 PROCEDURE — 80053 COMPREHEN METABOLIC PANEL: CPT | Performed by: EMERGENCY MEDICINE

## 2023-04-06 PROCEDURE — 85730 THROMBOPLASTIN TIME PARTIAL: CPT | Performed by: EMERGENCY MEDICINE

## 2023-04-06 PROCEDURE — 85025 COMPLETE CBC W/AUTO DIFF WBC: CPT | Performed by: EMERGENCY MEDICINE

## 2023-04-06 PROCEDURE — 93010 ELECTROCARDIOGRAM REPORT: CPT

## 2023-04-06 PROCEDURE — 93005 ELECTROCARDIOGRAM TRACING: CPT

## 2023-04-06 PROCEDURE — 36415 COLL VENOUS BLD VENIPUNCTURE: CPT

## 2023-04-06 RX ORDER — WARFARIN SODIUM 7.5 MG/1
7.5 TABLET ORAL
Status: DISCONTINUED | OUTPATIENT
Start: 2023-04-07 | End: 2023-04-06 | Stop reason: CLARIF

## 2023-04-06 RX ORDER — POLYETHYLENE GLYCOL 3350 17 G/17G
17 POWDER, FOR SOLUTION ORAL DAILY PRN
Status: DISCONTINUED | OUTPATIENT
Start: 2023-04-06 | End: 2023-04-07

## 2023-04-06 RX ORDER — ACETAMINOPHEN 500 MG
500 TABLET ORAL EVERY 4 HOURS PRN
Status: DISCONTINUED | OUTPATIENT
Start: 2023-04-06 | End: 2023-04-07

## 2023-04-06 RX ORDER — ONDANSETRON 2 MG/ML
4 INJECTION INTRAMUSCULAR; INTRAVENOUS EVERY 6 HOURS PRN
Status: DISCONTINUED | OUTPATIENT
Start: 2023-04-06 | End: 2023-04-07

## 2023-04-06 RX ORDER — SENNOSIDES 8.6 MG
17.2 TABLET ORAL NIGHTLY PRN
Status: DISCONTINUED | OUTPATIENT
Start: 2023-04-06 | End: 2023-04-07

## 2023-04-06 RX ORDER — SERTRALINE HYDROCHLORIDE 100 MG/1
200 TABLET, FILM COATED ORAL DAILY
Status: DISCONTINUED | OUTPATIENT
Start: 2023-04-07 | End: 2023-04-07

## 2023-04-06 RX ORDER — BISACODYL 10 MG
10 SUPPOSITORY, RECTAL RECTAL
Status: DISCONTINUED | OUTPATIENT
Start: 2023-04-06 | End: 2023-04-07

## 2023-04-06 RX ORDER — FINASTERIDE 5 MG/1
5 TABLET, FILM COATED ORAL DAILY
Status: DISCONTINUED | OUTPATIENT
Start: 2023-04-07 | End: 2023-04-07

## 2023-04-06 RX ORDER — FAMOTIDINE 20 MG/1
40 TABLET, FILM COATED ORAL DAILY
Status: DISCONTINUED | OUTPATIENT
Start: 2023-04-07 | End: 2023-04-07

## 2023-04-06 RX ORDER — LEVOTHYROXINE SODIUM 0.1 MG/1
100 TABLET ORAL
Status: DISCONTINUED | OUTPATIENT
Start: 2023-04-07 | End: 2023-04-07

## 2023-04-06 RX ORDER — SODIUM PHOSPHATE, DIBASIC AND SODIUM PHOSPHATE, MONOBASIC 7; 19 G/133ML; G/133ML
1 ENEMA RECTAL ONCE AS NEEDED
Status: DISCONTINUED | OUTPATIENT
Start: 2023-04-06 | End: 2023-04-07

## 2023-04-06 RX ORDER — LEVETIRACETAM 500 MG/1
1000 TABLET ORAL 2 TIMES DAILY
Status: DISCONTINUED | OUTPATIENT
Start: 2023-04-06 | End: 2023-04-07

## 2023-04-06 RX ORDER — ASPIRIN 81 MG/1
81 TABLET ORAL DAILY
Status: DISCONTINUED | OUTPATIENT
Start: 2023-04-07 | End: 2023-04-07

## 2023-04-06 RX ORDER — METOCLOPRAMIDE HYDROCHLORIDE 5 MG/ML
10 INJECTION INTRAMUSCULAR; INTRAVENOUS EVERY 8 HOURS PRN
Status: DISCONTINUED | OUTPATIENT
Start: 2023-04-06 | End: 2023-04-07

## 2023-04-06 NOTE — ED INITIAL ASSESSMENT (HPI)
Patient to the ER c/o dizziness. Patient has hx of emergency bypass 2006. Patient has been dizzy this morning and had US yesterday, US resulted positive for left leg clot. No n/v/d no fever Patient was on a recent trip to [place]. PCP concerned with Covid also.

## 2023-04-07 VITALS
OXYGEN SATURATION: 95 % | HEART RATE: 60 BPM | RESPIRATION RATE: 20 BRPM | TEMPERATURE: 98 F | SYSTOLIC BLOOD PRESSURE: 159 MMHG | WEIGHT: 248.81 LBS | BODY MASS INDEX: 32 KG/M2 | DIASTOLIC BLOOD PRESSURE: 81 MMHG

## 2023-04-07 LAB
ANION GAP SERPL CALC-SCNC: 6 MMOL/L (ref 0–18)
ATRIAL RATE: 64 BPM
BASOPHILS # BLD AUTO: 0.03 X10(3) UL (ref 0–0.2)
BASOPHILS NFR BLD AUTO: 0.6 %
BUN BLD-MCNC: 18 MG/DL (ref 7–18)
CALCIUM BLD-MCNC: 9 MG/DL (ref 8.5–10.1)
CHLORIDE SERPL-SCNC: 104 MMOL/L (ref 98–112)
CO2 SERPL-SCNC: 32 MMOL/L (ref 21–32)
CREAT BLD-MCNC: 0.91 MG/DL
EOSINOPHIL # BLD AUTO: 0.08 X10(3) UL (ref 0–0.7)
EOSINOPHIL NFR BLD AUTO: 1.6 %
ERYTHROCYTE [DISTWIDTH] IN BLOOD BY AUTOMATED COUNT: 15 %
GFR SERPLBLD BASED ON 1.73 SQ M-ARVRAT: 88 ML/MIN/1.73M2 (ref 60–?)
GLUCOSE BLD-MCNC: 108 MG/DL (ref 70–99)
HCT VFR BLD AUTO: 44.7 %
HGB BLD-MCNC: 14 G/DL
IMM GRANULOCYTES # BLD AUTO: 0.02 X10(3) UL (ref 0–1)
IMM GRANULOCYTES NFR BLD: 0.4 %
INR BLD: 2.21 (ref 0.85–1.16)
LYMPHOCYTES # BLD AUTO: 1.17 X10(3) UL (ref 1–4)
LYMPHOCYTES NFR BLD AUTO: 23.6 %
MCH RBC QN AUTO: 27.3 PG (ref 26–34)
MCHC RBC AUTO-ENTMCNC: 31.3 G/DL (ref 31–37)
MCV RBC AUTO: 87.3 FL
MONOCYTES # BLD AUTO: 0.48 X10(3) UL (ref 0.1–1)
MONOCYTES NFR BLD AUTO: 9.7 %
NEUTROPHILS # BLD AUTO: 3.17 X10 (3) UL (ref 1.5–7.7)
NEUTROPHILS # BLD AUTO: 3.17 X10(3) UL (ref 1.5–7.7)
NEUTROPHILS NFR BLD AUTO: 64.1 %
OSMOLALITY SERPL CALC.SUM OF ELEC: 296 MOSM/KG (ref 275–295)
P AXIS: -11 DEGREES
P-R INTERVAL: 232 MS
PLATELET # BLD AUTO: 163 10(3)UL (ref 150–450)
POTASSIUM SERPL-SCNC: 3.6 MMOL/L (ref 3.5–5.1)
PROTHROMBIN TIME: 24.3 SECONDS (ref 11.6–14.8)
Q-T INTERVAL: 474 MS
QRS DURATION: 164 MS
QTC CALCULATION (BEZET): 489 MS
R AXIS: 78 DEGREES
RBC # BLD AUTO: 5.12 X10(6)UL
SODIUM SERPL-SCNC: 142 MMOL/L (ref 136–145)
T AXIS: 61 DEGREES
VENTRICULAR RATE: 64 BPM
WBC # BLD AUTO: 5 X10(3) UL (ref 4–11)

## 2023-04-07 PROCEDURE — 85610 PROTHROMBIN TIME: CPT | Performed by: HOSPITALIST

## 2023-04-07 PROCEDURE — 80048 BASIC METABOLIC PNL TOTAL CA: CPT | Performed by: HOSPITALIST

## 2023-04-07 PROCEDURE — 85025 COMPLETE CBC W/AUTO DIFF WBC: CPT | Performed by: HOSPITALIST

## 2023-04-07 RX ORDER — WARFARIN SODIUM 5 MG/1
5 TABLET ORAL
Status: DISCONTINUED | OUTPATIENT
Start: 2023-04-07 | End: 2023-04-07

## 2023-04-07 NOTE — PLAN OF CARE
NURSING DISCHARGE NOTE    Discharged Home via Wheelchair. Accompanied by Support staff  Belongings Taken by patient/family. Pt AxO4  RA  NSR  No pain at this time  1x assist/standby    Heme c/s  IR procedure(pend)  -cancelled per order  Pt informed of POC, all questions answered    Pt given and informed of discharge instructions, home medications, and f/u dates. Pt given further education handouts related to admission. Pt verbally understands discharge instructions.     Problem: HEMATOLOGIC - ADULT  Goal: Maintains hematologic stability  Description: INTERVENTIONS  - Assess for signs and symptoms of bleeding or hemorrhage  - Monitor labs and vital signs for trends  - Administer supportive blood products/factors, fluids and medications as ordered and appropriate  - Administer supportive blood products/factors as ordered and appropriate  4/7/2023 1449 by Barby Smith RN  Outcome: Adequate for Discharge  4/7/2023 1145 by Barby Smith RN  Outcome: Progressing  Goal: Free from bleeding injury  Description: (Example usage: patient with low platelets)  INTERVENTIONS:  - Avoid intramuscular injections, enemas and rectal medication administration  - Ensure safe mobilization of patient  - Hold pressure on venipuncture sites to achieve adequate hemostasis  - Assess for signs and symptoms of internal bleeding  - Monitor lab trends  - Patient is to report abnormal signs of bleeding to staff  - Avoid use of toothpicks and dental floss  - Use electric shaver for shaving  - Use soft bristle tooth brush  - Limit straining and forceful nose blowing  4/7/2023 1449 by Barby Smith RN  Outcome: Adequate for Discharge  4/7/2023 1145 by Barby Smith RN  Outcome: Progressing     Problem: Patient/Family Goals  Goal: Patient/Family Long Term Goal  Description: Patient's Long Term Goal: discharge in stable condition    Interventions:  - monitor s/s of bleeding  - fall precautions in place  - See additional Care Plan goals for specific interventions  4/7/2023 1449 by Kelsey Moss RN  Outcome: Adequate for Discharge  4/7/2023 1145 by Kelsey Moss RN  Outcome: Progressing  Goal: Patient/Family Short Term Goal  Description: Patient's Short Term Goal: pain control    Interventions:   - follow medications as ordered  - PRN/non-pharmalogical methods  - See additional Care Plan goals for specific interventions  4/7/2023 1449 by Kelsey Moss RN  Outcome: Adequate for Discharge  4/7/2023 1145 by Kelsey Moss RN  Outcome: Progressing

## 2023-04-07 NOTE — PROGRESS NOTES
[facility] Dosing Service  Warfarin (Coumadin) Initial Dosing    Jama Fernandes is a 78year old patient for whom pharmacy has been consulted to dose warfarin (COUMADIN) for Treatment of venous thrombosis by Dr. Cara Lopez. Based on this indication, goal INR is 2-3. Pertinent Patient Medical History:  hx of DVT/PE, CAD/CABG  Potential Drug Interactions:  none significant     Latest INR:   Recent Labs     04/06/23  1813   INR 2.30*       Other Anticoagulants:  none   Home regimen (if applicable):  5 mg- Mon/Wed/Fri/Sat and 7.5 mg Sun/Tues/Thurs   Date/Time last dose was given (if applicable): 4/6 prior to coming in to ER    Based on above -  1. For today, Hold warfarin (COUMADIN) dose tonight since the patient already took a dose earlier tonight and INR at goal.      2.  PT/INR ordered daily while on warfarin    3. Pharmacy will continue to follow. We appreciate the opportunity to assist in the care of this patient.     Annabel Maynard, PharmD  4/6/2023  9:27 PM

## 2023-04-07 NOTE — ED QUICK NOTES
Orders for admission, patient is aware of plan and ready to go upstairs. Any questions, please call ED RN [phone]. Vaccinated?   Type of COVID test sent:Neg result  COVID Suspicion level: Low/High      Titratable drug(s) infusing:DNA  Rate:    LOC at time of transport:A6    Other pertinent information:    CIWA score=  NIH score=

## 2023-04-07 NOTE — PROGRESS NOTES
NURSING ADMISSION NOTE      Patient admitted via cart at approx 2100. Oriented to room. Safety precautions initiated. Bed in low position. Call light in reach. Patient admitted for LLE DVT. A&Ox4. Tele SR/SB, on room air. JOSÉ MANUEL refuse CPAP, 2L NC HS. Denies pain. Non pitting edema to BLE. Up x 1 and cane. On coumadin took dose PTA. INR in AM.  Patient updated on POC.

## 2023-04-07 NOTE — PROGRESS NOTES
23 1258   Over the last 2 weeks, how often have you been bothered by any of the following problems? Little interest or pleasure in doing things 0   Feeling down, depressed, or hopeless 0   Trouble falling or staying asleep, or sleeping too much 2   Feeling tired or having little energy 1   Poor appetite or overeating 0   Feeling bad about yourself - or that you are a failure or have let yourself or your family down 0   Trouble concentrating on things, such as reading the newspaper or watching television 0   Moving or speaking so slowly that other people could have noticed. Or the opposite - being so fidgety or restless that you have been moving around a lot more than usual 0   Thoughts that you would be better off dead, or of hurting yourself in some way 0   PHQ-9 TOTAL SCORE 3   If you checked off any problems, how difficult have these problems made it for you to do your work, take care of things at home, or get along with other people? Somewhat difficult   [address] [facility] Resource Referral Counselor Note    Bhupinder Fragoso Patient Status:  Inpatient    1945 MRN [id number]   [facility] 7NE-A Attending Judith Soria MD   Hosp Day # 1 PCP Rhianna Chapman MD       S(subjective) Patient admits to having a history of anxiety and depression. He said, he is on medication per pcp. Patient admits to having a lot of medical issues going on and that is stressful. He denies any suicidal thoughts. O(objective) Patient is alert and oriented x4. His mood and affect is wnl. A(assessment) The phq9 was completed. P(plan) He declined any referrals for a mental health provider.       Julia Ramirez RN  2023  1:02 PM

## 2023-04-07 NOTE — CONSULTS
[facility] Dosing Service  Warfarin (Coumadin) Subsequent Dosing    Hannah Lakhani is a 78year old patient for whom pharmacy is dosing warfarin (Coumadin). Goal INR is 2-3    Recent Labs   Lab 04/06/23  1813 04/07/23  0533   INR 2.30* 2.21*       Consulted by:  Dr. Cesar Aiken  Indication:  DVT  Potential Drug Interactions:  aspirin, levothyroxine  Other Anticoagulants:  none  Home regimen (if applicable):  warfarin 5 mg [facility]; warfarin 7.5 mg [facility]    Inpatient Dosing History:    Date 4/6 4/7       INR 2.30 2.21       Coumadin dose Took PTA                 Based on above -  1. For today, Give warfarin (COUMADIN) 5 mg mg at 2100 tonight  2   PT/INR ordered daily while on warfarin  3. Pharmacy will continue to follow. We appreciate the opportunity to assist in the care of this patient.     Tiffanie Jiménez, PharmD  4/7/2023  10:12 AM

## 2023-04-24 ENCOUNTER — HOSPITAL ENCOUNTER (INPATIENT)
Facility: HOSPITAL | Age: 78
LOS: 3 days | Discharge: HOME | End: 2023-04-27
Attending: EMERGENCY MEDICINE | Admitting: INTERNAL MEDICINE
Payer: MEDICARE

## 2023-04-24 DIAGNOSIS — R31.0 GROSS HEMATURIA: Primary | ICD-10-CM

## 2023-04-24 LAB
ALBUMIN SERPL-MCNC: 3.4 G/DL (ref 3.4–5)
ALBUMIN/GLOB SERPL: 0.9 {RATIO} (ref 1–2)
ALP LIVER SERPL-CCNC: 80 U/L
ALT SERPL-CCNC: 15 U/L
ANION GAP SERPL CALC-SCNC: 6 MMOL/L (ref 0–18)
APTT PPP: 41 SECONDS (ref 23.3–35.6)
AST SERPL-CCNC: 10 U/L (ref 15–37)
BASOPHILS # BLD AUTO: 0.03 X10(3) UL (ref 0–0.2)
BASOPHILS NFR BLD AUTO: 0.5 %
BILIRUB SERPL-MCNC: 0.8 MG/DL (ref 0.1–2)
BILIRUB UR QL STRIP.AUTO: NEGATIVE
BUN BLD-MCNC: 17 MG/DL (ref 7–18)
CALCIUM BLD-MCNC: 8.6 MG/DL (ref 8.5–10.1)
CHLORIDE SERPL-SCNC: 105 MMOL/L (ref 98–112)
CO2 SERPL-SCNC: 30 MMOL/L (ref 21–32)
CREAT BLD-MCNC: 0.93 MG/DL
EOSINOPHIL # BLD AUTO: 0.08 X10(3) UL (ref 0–0.7)
EOSINOPHIL NFR BLD AUTO: 1.4 %
ERYTHROCYTE [DISTWIDTH] IN BLOOD BY AUTOMATED COUNT: 14.1 %
GFR SERPLBLD BASED ON 1.73 SQ M-ARVRAT: 86 ML/MIN/1.73M2 (ref 60–?)
GLOBULIN PLAS-MCNC: 3.6 G/DL (ref 2.8–4.4)
GLUCOSE BLD-MCNC: 114 MG/DL (ref 70–99)
GLUCOSE UR STRIP.AUTO-MCNC: 50 MG/DL
HCT VFR BLD AUTO: 40.7 %
HGB BLD-MCNC: 13.1 G/DL
IMM GRANULOCYTES # BLD AUTO: 0.02 X10(3) UL (ref 0–1)
IMM GRANULOCYTES NFR BLD: 0.4 %
INR BLD: 2.22 (ref 0.85–1.16)
KETONES UR STRIP.AUTO-MCNC: NEGATIVE MG/DL
LEUKOCYTE ESTERASE UR QL STRIP.AUTO: NEGATIVE
LYMPHOCYTES # BLD AUTO: 0.95 X10(3) UL (ref 1–4)
LYMPHOCYTES NFR BLD AUTO: 17 %
MCH RBC QN AUTO: 27.3 PG (ref 26–34)
MCHC RBC AUTO-ENTMCNC: 32.2 G/DL (ref 31–37)
MCV RBC AUTO: 85 FL
MONOCYTES # BLD AUTO: 0.4 X10(3) UL (ref 0.1–1)
MONOCYTES NFR BLD AUTO: 7.2 %
NEUTROPHILS # BLD AUTO: 4.1 X10 (3) UL (ref 1.5–7.7)
NEUTROPHILS # BLD AUTO: 4.1 X10(3) UL (ref 1.5–7.7)
NEUTROPHILS NFR BLD AUTO: 73.5 %
NITRITE UR QL STRIP.AUTO: NEGATIVE
OSMOLALITY SERPL CALC.SUM OF ELEC: 294 MOSM/KG (ref 275–295)
PH UR STRIP.AUTO: 6 [PH] (ref 5–8)
PLATELET # BLD AUTO: 166 10(3)UL (ref 150–450)
POTASSIUM SERPL-SCNC: 3.4 MMOL/L (ref 3.5–5.1)
PROT SERPL-MCNC: 7 G/DL (ref 6.4–8.2)
PROT UR STRIP.AUTO-MCNC: 100 MG/DL
PROTHROMBIN TIME: 24.4 SECONDS (ref 11.6–14.8)
RBC # BLD AUTO: 4.79 X10(6)UL
RBC #/AREA URNS AUTO: >10 /HPF
SODIUM SERPL-SCNC: 141 MMOL/L (ref 136–145)
SP GR UR STRIP.AUTO: 1.02 (ref 1–1.03)
UROBILINOGEN UR STRIP.AUTO-MCNC: <2 MG/DL
WBC # BLD AUTO: 5.6 X10(3) UL (ref 4–11)

## 2023-04-24 PROCEDURE — 80053 COMPREHEN METABOLIC PANEL: CPT | Performed by: EMERGENCY MEDICINE

## 2023-04-24 PROCEDURE — 99285 EMERGENCY DEPT VISIT HI MDM: CPT

## 2023-04-24 PROCEDURE — 85025 COMPLETE CBC W/AUTO DIFF WBC: CPT | Performed by: EMERGENCY MEDICINE

## 2023-04-24 PROCEDURE — 51700 IRRIGATION OF BLADDER: CPT

## 2023-04-24 PROCEDURE — 96374 THER/PROPH/DIAG INJ IV PUSH: CPT

## 2023-04-24 PROCEDURE — 85610 PROTHROMBIN TIME: CPT | Performed by: EMERGENCY MEDICINE

## 2023-04-24 PROCEDURE — 81001 URINALYSIS AUTO W/SCOPE: CPT | Performed by: EMERGENCY MEDICINE

## 2023-04-24 PROCEDURE — 85730 THROMBOPLASTIN TIME PARTIAL: CPT | Performed by: EMERGENCY MEDICINE

## 2023-04-24 RX ORDER — LORAZEPAM 2 MG/ML
INJECTION INTRAMUSCULAR
Status: COMPLETED
Start: 2023-04-24 | End: 2023-04-24

## 2023-04-24 RX ORDER — LIDOCAINE HYDROCHLORIDE 20 MG/ML
10 JELLY TOPICAL ONCE
Status: COMPLETED | OUTPATIENT
Start: 2023-04-24 | End: 2023-04-24

## 2023-04-24 RX ORDER — POTASSIUM CHLORIDE 20 MEQ/1
40 TABLET, EXTENDED RELEASE ORAL EVERY 4 HOURS
Status: COMPLETED | OUTPATIENT
Start: 2023-04-24 | End: 2023-04-24

## 2023-04-24 RX ORDER — FAMOTIDINE 20 MG/1
40 TABLET, FILM COATED ORAL DAILY
Status: DISCONTINUED | OUTPATIENT
Start: 2023-04-25 | End: 2023-04-27

## 2023-04-24 RX ORDER — FINASTERIDE 5 MG/1
5 TABLET, FILM COATED ORAL NIGHTLY
Status: DISCONTINUED | OUTPATIENT
Start: 2023-04-24 | End: 2023-04-27

## 2023-04-24 RX ORDER — MAGNESIUM HYDROXIDE 1200 MG/15ML
3000 LIQUID ORAL CONTINUOUS
Status: DISCONTINUED | OUTPATIENT
Start: 2023-04-24 | End: 2023-04-27

## 2023-04-24 RX ORDER — ACETAMINOPHEN 325 MG/1
650 TABLET ORAL EVERY 6 HOURS PRN
Status: DISCONTINUED | OUTPATIENT
Start: 2023-04-24 | End: 2023-04-27

## 2023-04-24 RX ORDER — SERTRALINE HYDROCHLORIDE 100 MG/1
200 TABLET, FILM COATED ORAL NIGHTLY
Status: DISCONTINUED | OUTPATIENT
Start: 2023-04-24 | End: 2023-04-27

## 2023-04-24 RX ORDER — LEVOTHYROXINE SODIUM 0.1 MG/1
100 TABLET ORAL
Status: DISCONTINUED | OUTPATIENT
Start: 2023-04-25 | End: 2023-04-27

## 2023-04-24 RX ORDER — ONDANSETRON 2 MG/ML
4 INJECTION INTRAMUSCULAR; INTRAVENOUS EVERY 6 HOURS PRN
Status: DISCONTINUED | OUTPATIENT
Start: 2023-04-24 | End: 2023-04-27

## 2023-04-24 RX ORDER — LORAZEPAM 2 MG/ML
2 INJECTION INTRAMUSCULAR ONCE
Status: COMPLETED | OUTPATIENT
Start: 2023-04-24 | End: 2023-04-24

## 2023-04-24 RX ORDER — ALPRAZOLAM 0.25 MG/1
0.25 TABLET ORAL 3 TIMES DAILY PRN
Status: DISCONTINUED | OUTPATIENT
Start: 2023-04-24 | End: 2023-04-27

## 2023-04-24 RX ORDER — LEVETIRACETAM 500 MG/1
1000 TABLET ORAL 2 TIMES DAILY
Status: DISCONTINUED | OUTPATIENT
Start: 2023-04-24 | End: 2023-04-27

## 2023-04-24 NOTE — PLAN OF CARE
Received patient from ED @ 1410 with CBI running moderately with pale pink output. No clots. Patient has no complaints of pain. Is normal sinus rhythm with 1st degree AV block on room air, no chest pain, no shortness. Patient is has benito in place. Last bm prior to admission. Fall precautions in place, bed and chair alarm in use. Admission navigator complete Hospitalist paged. Patient and family updated on plan of care.

## 2023-04-24 NOTE — ED INITIAL ASSESSMENT (HPI)
Patient swtiched from Coumadin to Xarelto 9 days ago. Blood in urine since then.  Seen at Urgent Care, had UA done, no infection

## 2023-04-25 LAB
ANION GAP SERPL CALC-SCNC: 3 MMOL/L (ref 0–18)
BASOPHILS # BLD AUTO: 0.03 X10(3) UL (ref 0–0.2)
BASOPHILS NFR BLD AUTO: 0.4 %
BUN BLD-MCNC: 22 MG/DL (ref 7–18)
CALCIUM BLD-MCNC: 8.8 MG/DL (ref 8.5–10.1)
CHLORIDE SERPL-SCNC: 107 MMOL/L (ref 98–112)
CO2 SERPL-SCNC: 28 MMOL/L (ref 21–32)
CREAT BLD-MCNC: 0.94 MG/DL
EOSINOPHIL # BLD AUTO: 0.06 X10(3) UL (ref 0–0.7)
EOSINOPHIL NFR BLD AUTO: 0.8 %
ERYTHROCYTE [DISTWIDTH] IN BLOOD BY AUTOMATED COUNT: 14.5 %
GFR SERPLBLD BASED ON 1.73 SQ M-ARVRAT: 85 ML/MIN/1.73M2 (ref 60–?)
GLUCOSE BLD-MCNC: 110 MG/DL (ref 70–99)
HCT VFR BLD AUTO: 40.4 %
HGB BLD-MCNC: 13 G/DL
IMM GRANULOCYTES # BLD AUTO: 0.03 X10(3) UL (ref 0–1)
IMM GRANULOCYTES NFR BLD: 0.4 %
LYMPHOCYTES # BLD AUTO: 0.83 X10(3) UL (ref 1–4)
LYMPHOCYTES NFR BLD AUTO: 10.7 %
MCH RBC QN AUTO: 28 PG (ref 26–34)
MCHC RBC AUTO-ENTMCNC: 32.2 G/DL (ref 31–37)
MCV RBC AUTO: 87.1 FL
MONOCYTES # BLD AUTO: 0.58 X10(3) UL (ref 0.1–1)
MONOCYTES NFR BLD AUTO: 7.5 %
NEUTROPHILS # BLD AUTO: 6.22 X10 (3) UL (ref 1.5–7.7)
NEUTROPHILS # BLD AUTO: 6.22 X10(3) UL (ref 1.5–7.7)
NEUTROPHILS NFR BLD AUTO: 80.2 %
OSMOLALITY SERPL CALC.SUM OF ELEC: 290 MOSM/KG (ref 275–295)
PLATELET # BLD AUTO: 150 10(3)UL (ref 150–450)
POTASSIUM SERPL-SCNC: 4.3 MMOL/L (ref 3.5–5.1)
POTASSIUM SERPL-SCNC: 4.4 MMOL/L (ref 3.5–5.1)
RBC # BLD AUTO: 4.64 X10(6)UL
SODIUM SERPL-SCNC: 138 MMOL/L (ref 136–145)
WBC # BLD AUTO: 7.8 X10(3) UL (ref 4–11)

## 2023-04-25 PROCEDURE — 80048 BASIC METABOLIC PNL TOTAL CA: CPT | Performed by: INTERNAL MEDICINE

## 2023-04-25 PROCEDURE — 84132 ASSAY OF SERUM POTASSIUM: CPT | Performed by: INTERNAL MEDICINE

## 2023-04-25 PROCEDURE — 85025 COMPLETE CBC W/AUTO DIFF WBC: CPT | Performed by: INTERNAL MEDICINE

## 2023-04-25 NOTE — DISCHARGE INSTRUCTIONS
Sobia Leighch UP WITH YOUR HEMATOLOGIST NEXT WEEK      Doctor has asked you to schedule a Cystoscopy    This is a procedure done in the office. There is no preparation needed on your part. A Cystoscopy is a test that allows your doctor to look the inside the bladder and the urethra using a thin, lighted instrument called a cystoscope. The cystoscope is inserted into your urethra and slowly advanced into the bladder. A cystoscopy allows your doctor to look at areas of your bladder and urethra that usually do not show up well on X-rays. Tiny surgical instruments can be inserted through the cystoscope that allow your doctor to remove samples of tissue (biopsy) or samples of urine. Small bladder stones and some small growths can be removed during cystoscopy. This may eliminate the need for more extensive surgery. Why It Is Done  Find the cause of symptoms such as blood in the urine (hematuria), painful urination (dysuria), urinary incontinence, urinary frequency or hesitancy, an inability to pass urine (retention), or a sudden and overwhelming need to urinate (urgency). Find the cause of problems of the urinary tract, such as frequent, repeated urinary tract infections or urinary tract infections that do not respond to treatment. Look for problems in the urinary tract, such as blockage in the urethra caused by an enlarged prostate, kidney stones, or tumors. Evaluate problems that cannot be seen on X-ray or to further investigate problems detected by ultrasound or during intravenous pyelography, such as kidney stones or tumors. Remove tissue samples for biopsy. Remove foreign objects. Treat urinary tract problems. For example, cystoscopy can be done to remove urinary tract stones or growths, treat bleeding in the bladder, relieve blockages in the urethra, or treat or remove tumors. How To Prepare  You may eat and drink normally before the procedure.  You may be asked to give a urine sample at the time of your procedure. Please do not urinate before. How It Is Done  A cystoscopy is performed by a urologist, with one or more assistants. The test is done in a special testing room in the doctor's office. You will undress from the waist down, and be given a paper drape to cover yourself. You will lie on your back on a special table. Females will have their knees bent and feet placed in stirrups. Males will lay flat on the table, legs straight. Your genital area is cleaned with an antiseptic solution. A local anesthetic jelly will be inserted into the urethra. No needles are used. After the anesthetic takes effect, a well-lubricated cystoscope is inserted into your urethra and slowly advanced into your bladder. If your urethra has a spot that is too narrow to allow the scope to pass, other smaller instruments are inserted first to gradually enlarge the opening. After the cystoscope is inside your bladder, sterile water runs through the scope to help expand your bladder and to create a clear view. Tiny instruments may be inserted through the scope to collect tissue samples for a biopsy if necessary; the tissue samples are sent to the laboratory for analysis. Cautery may be used if bleeding occurs from a specimen site. The cystoscope is usually in your bladder for only 1-2 minutes. But the entire test with preparation may take up to 20 minutes or longer. You will be able to get up immediately following the procedure and proceed with normal daily activities. After the test  You may need to urinate frequently. You may experience some burning during urination for a day or two. Drink lots of fluids to help minimize the burning and help prevent a urinary tract infection. You may also see a tinge of blood in the urine for 2-3 days. Some patients experience pain for a few days afterwards.   This is normal.  If the pain is intolerable please contact our office or go to the nearest Emergency Room/Immediate Care. You will be given an instruction sheet following your cystoscopy with post procedure instructions. Results  Your doctor may be able to talk to you about the results during the cystoscopy. If a biopsy is preformed, the results usually take several days to become available. You will be called promptly with results.

## 2023-04-26 ENCOUNTER — APPOINTMENT (OUTPATIENT)
Dept: ULTRASOUND IMAGING | Facility: HOSPITAL | Age: 78
End: 2023-04-26
Attending: INTERNAL MEDICINE
Payer: MEDICARE

## 2023-04-26 LAB
APTT PPP: 31.4 SECONDS (ref 23.3–35.6)
FIBRINOGEN PPP-MCNC: 504 MG/DL (ref 180–480)
INR BLD: 1.18 (ref 0.85–1.16)
PROTHROMBIN TIME: 15 SECONDS (ref 11.6–14.8)

## 2023-04-26 PROCEDURE — 85730 THROMBOPLASTIN TIME PARTIAL: CPT | Performed by: INTERNAL MEDICINE

## 2023-04-26 PROCEDURE — 85610 PROTHROMBIN TIME: CPT | Performed by: INTERNAL MEDICINE

## 2023-04-26 PROCEDURE — 93971 EXTREMITY STUDY: CPT | Performed by: INTERNAL MEDICINE

## 2023-04-26 PROCEDURE — 85384 FIBRINOGEN ACTIVITY: CPT | Performed by: INTERNAL MEDICINE

## 2023-04-27 VITALS
SYSTOLIC BLOOD PRESSURE: 125 MMHG | DIASTOLIC BLOOD PRESSURE: 69 MMHG | TEMPERATURE: 98 F | BODY MASS INDEX: 31.58 KG/M2 | HEART RATE: 63 BPM | HEIGHT: 74 IN | WEIGHT: 246.06 LBS | OXYGEN SATURATION: 97 % | RESPIRATION RATE: 18 BRPM

## 2023-05-26 RX ORDER — LEVETIRACETAM 500 MG/1
500 TABLET ORAL 2 TIMES DAILY
COMMUNITY

## 2023-05-26 RX ORDER — ASPIRIN 81 MG/1
81 TABLET, CHEWABLE ORAL EVERY OTHER DAY
COMMUNITY

## 2023-05-26 RX ORDER — ATORVASTATIN CALCIUM 40 MG/1
1 TABLET, FILM COATED ORAL DAILY
COMMUNITY
Start: 2022-10-13

## 2023-05-27 ENCOUNTER — LABORATORY ENCOUNTER (OUTPATIENT)
Dept: LAB | Age: 78
End: 2023-05-27
Attending: INTERNAL MEDICINE
Payer: MEDICARE

## 2023-05-27 DIAGNOSIS — Z01.818 PRE-OP TESTING: ICD-10-CM

## 2023-05-27 LAB
CHLORIDE SERPL-SCNC: 107 MMOL/L (ref 98–112)
CO2 SERPL-SCNC: 30 MMOL/L (ref 21–32)
POTASSIUM SERPL-SCNC: 3.3 MMOL/L (ref 3.5–5.1)
SODIUM SERPL-SCNC: 139 MMOL/L (ref 136–145)

## 2023-05-27 PROCEDURE — 80051 ELECTROLYTE PANEL: CPT

## 2023-05-27 PROCEDURE — 36415 COLL VENOUS BLD VENIPUNCTURE: CPT

## 2023-05-29 ENCOUNTER — ANESTHESIA EVENT (OUTPATIENT)
Dept: ENDOSCOPY | Facility: HOSPITAL | Age: 78
End: 2023-05-29
Payer: MEDICARE

## 2023-05-30 ENCOUNTER — ANESTHESIA (OUTPATIENT)
Dept: ENDOSCOPY | Facility: HOSPITAL | Age: 78
End: 2023-05-30
Payer: MEDICARE

## 2023-05-30 ENCOUNTER — HOSPITAL ENCOUNTER (OUTPATIENT)
Facility: HOSPITAL | Age: 78
Setting detail: HOSPITAL OUTPATIENT SURGERY
Discharge: HOME | End: 2023-05-30
Attending: INTERNAL MEDICINE | Admitting: INTERNAL MEDICINE
Payer: MEDICARE

## 2023-05-30 VITALS
DIASTOLIC BLOOD PRESSURE: 76 MMHG | BODY MASS INDEX: 31.57 KG/M2 | SYSTOLIC BLOOD PRESSURE: 131 MMHG | HEART RATE: 57 BPM | RESPIRATION RATE: 16 BRPM | HEIGHT: 74 IN | OXYGEN SATURATION: 93 % | WEIGHT: 246 LBS | TEMPERATURE: 97 F

## 2023-05-30 DIAGNOSIS — Z01.818 PRE-OP TESTING: Primary | ICD-10-CM

## 2023-05-30 PROCEDURE — 0DB98ZX EXCISION OF DUODENUM, VIA NATURAL OR ARTIFICIAL OPENING ENDOSCOPIC, DIAGNOSTIC: ICD-10-PCS | Performed by: INTERNAL MEDICINE

## 2023-05-30 PROCEDURE — 88305 TISSUE EXAM BY PATHOLOGIST: CPT | Performed by: INTERNAL MEDICINE

## 2023-05-30 PROCEDURE — 0DB68ZX EXCISION OF STOMACH, VIA NATURAL OR ARTIFICIAL OPENING ENDOSCOPIC, DIAGNOSTIC: ICD-10-PCS | Performed by: INTERNAL MEDICINE

## 2023-05-30 RX ORDER — LIDOCAINE HYDROCHLORIDE 10 MG/ML
INJECTION, SOLUTION EPIDURAL; INFILTRATION; INTRACAUDAL; PERINEURAL AS NEEDED
Status: DISCONTINUED | OUTPATIENT
Start: 2023-05-30 | End: 2023-05-30 | Stop reason: SURG

## 2023-05-30 RX ORDER — SODIUM CHLORIDE, SODIUM LACTATE, POTASSIUM CHLORIDE, CALCIUM CHLORIDE 600; 310; 30; 20 MG/100ML; MG/100ML; MG/100ML; MG/100ML
INJECTION, SOLUTION INTRAVENOUS CONTINUOUS
Status: DISCONTINUED | OUTPATIENT
Start: 2023-05-30 | End: 2023-05-30

## 2023-05-30 RX ADMIN — SODIUM CHLORIDE, SODIUM LACTATE, POTASSIUM CHLORIDE, CALCIUM CHLORIDE: 600; 310; 30; 20 INJECTION, SOLUTION INTRAVENOUS at 11:16:00

## 2023-05-30 RX ADMIN — LIDOCAINE HYDROCHLORIDE 25 MG: 10 INJECTION, SOLUTION EPIDURAL; INFILTRATION; INTRACAUDAL; PERINEURAL at 11:18:00

## 2023-05-30 NOTE — ANESTHESIA POSTPROCEDURE EVALUATION
[address], Patient Status:  Hospital Outpatient Surgery   Age/Gender 78year old male MRN [id number]   Location [address] [facility] 28 Attending No att. providers found   [facility] Day # 0 PCP Laura Newell MD       Anesthesia Post-op Note    ESOPHAGOGASTRODUODENOSCOPY (EGD) w/ biopsies    Procedure Summary     Date: 05/30/23 Room / Location: [facility] ENDOSCOPY 03 / 1404 [facility] ENDOSCOPY    Anesthesia Start: 1322 Anesthesia Stop: 3865    Procedure: ESOPHAGOGASTRODUODENOSCOPY (EGD) w/ biopsies Diagnosis: (Gastritis, Esophagitis)    Surgeons: Armond Hardin MD Anesthesiologist: Radha Foreman MD    Anesthesia Type: MAC ASA Status: 3          Anesthesia Type: MAC    Vitals Value Taken Time   /76 05/30/23 1145   Temp 98.0 05/30/23 1304   Pulse 57 05/30/23 1150   Resp 16 05/30/23 1150   SpO2 93 % 05/30/23 1150       Patient Location: Endoscopy    Anesthesia Type: MAC    Airway Patency: patent    Postop Pain Control: adequate    Mental Status: mildly sedated but able to meaningfully participate in the post-anesthesia evaluation    Nausea/Vomiting: none    Cardiopulmonary/Hydration status: stable euvolemic    Complications: no apparent anesthesia related complications    Postop vital signs: stable    Dental Exam: Unchanged from Preop    Patient to be discharged home when criteria met.

## 2023-05-30 NOTE — OPERATIVE REPORT
OPERATIVE REPORT   PATIENT NAME: Allyson Martins  MRN: [id number]  DATE OF OPERATION: 5/30/2023  PREOPERATIVE DIAGNOSIS: weight loss, abdominal pain/ bloating, dark stools  POSTOPERATIVE DIAGNOSIS:    1. Minimal coffee ground material in stomach   2. Mild gastritis and esophagitis  PROCEDURE PERFORMED: Esophagogastroduodenoscopy  SEDATION MEDICATIONS: MAC  MODERATE SEDATION TIME: None. Deep sedation provided by anesthesia  PREPROCEDURE ASSESSMENT: The indication for this procedure is to assess for ulcers. The patient was identified by myself and nursing staff in the exam room. Informed consent was obtained. The patient was seen in clinic and a full H&P was obtained. On brief physical examination, airway is patent. Chest is clear. Heart has regular rate and rhythm. Abdomen is soft, nontender with good bowel sounds. A medication list was taken by nursing today and reviewed by myself. The patient is an ASA grade 2. PROCEDURE NOTE: The procedure was completed without difficulty. The patient tolerated the procedure well. The endoscope was inserted through the mouth and advanced to the level of the duodenum, 3rd portion. Esophagus appeared normal without stricture but mild esophagitis was noted. No hiatal hernia or Kerr's esophagus was noted. Stomach was normal in the antrum and the body except for mild gastritis. Duodenum was normal in the bulb and 2nd duodenum. Retroflexed view in the stomach revealed normal fundus and cardia. Small bowel biopsies were taken for celiac sprue and gastric biopsies were taken for Helicobacter pylori. There were no immediate complications. FINDINGS   1. No etiology for symptoms  RECOMMENDATIONS: follow up with Dr. Geary Apley:  On discharge, the patient was given an after-visit summary detailing the procedure, findings, followup plans, and an updated medication list.     Thank you very much for the consultation. I really appreciate it.     Luis Stoll MD

## 2023-06-24 ENCOUNTER — APPOINTMENT (OUTPATIENT)
Dept: CT IMAGING | Age: 78
End: 2023-06-24
Attending: EMERGENCY MEDICINE
Payer: MEDICARE

## 2023-06-24 ENCOUNTER — APPOINTMENT (OUTPATIENT)
Dept: GENERAL RADIOLOGY | Age: 78
End: 2023-06-24
Attending: EMERGENCY MEDICINE
Payer: MEDICARE

## 2023-06-24 ENCOUNTER — HOSPITAL ENCOUNTER (EMERGENCY)
Age: 78
Discharge: HOME | End: 2023-06-24
Attending: EMERGENCY MEDICINE
Payer: MEDICARE

## 2023-06-24 VITALS
DIASTOLIC BLOOD PRESSURE: 72 MMHG | OXYGEN SATURATION: 99 % | BODY MASS INDEX: 31.44 KG/M2 | TEMPERATURE: 97 F | HEIGHT: 74 IN | RESPIRATION RATE: 18 BRPM | WEIGHT: 245 LBS | SYSTOLIC BLOOD PRESSURE: 129 MMHG | HEART RATE: 80 BPM

## 2023-06-24 DIAGNOSIS — J18.9 COMMUNITY ACQUIRED PNEUMONIA OF LEFT LOWER LOBE OF LUNG: ICD-10-CM

## 2023-06-24 DIAGNOSIS — B30.9 ACUTE VIRAL CONJUNCTIVITIS OF RIGHT EYE: Primary | ICD-10-CM

## 2023-06-24 LAB
ALBUMIN SERPL-MCNC: 3.1 G/DL (ref 3.4–5)
ALBUMIN/GLOB SERPL: 0.7 {RATIO} (ref 1–2)
ALP LIVER SERPL-CCNC: 67 U/L
ALT SERPL-CCNC: 21 U/L
ANION GAP SERPL CALC-SCNC: 5 MMOL/L (ref 0–18)
AST SERPL-CCNC: 20 U/L (ref 15–37)
BASOPHILS # BLD AUTO: 0.01 X10(3) UL (ref 0–0.2)
BASOPHILS NFR BLD AUTO: 0.2 %
BILIRUB SERPL-MCNC: 1 MG/DL (ref 0.1–2)
BILIRUB UR QL STRIP.AUTO: NEGATIVE
BUN BLD-MCNC: 20 MG/DL (ref 7–18)
CALCIUM BLD-MCNC: 8.7 MG/DL (ref 8.5–10.1)
CHLORIDE SERPL-SCNC: 101 MMOL/L (ref 98–112)
CLARITY UR REFRACT.AUTO: CLEAR
CO2 SERPL-SCNC: 30 MMOL/L (ref 21–32)
COLOR UR AUTO: YELLOW
CREAT BLD-MCNC: 1.01 MG/DL
EOSINOPHIL # BLD AUTO: 0.03 X10(3) UL (ref 0–0.7)
EOSINOPHIL NFR BLD AUTO: 0.7 %
ERYTHROCYTE [DISTWIDTH] IN BLOOD BY AUTOMATED COUNT: 14.3 %
GFR SERPLBLD BASED ON 1.73 SQ M-ARVRAT: 78 ML/MIN/1.73M2 (ref 60–?)
GLOBULIN PLAS-MCNC: 4.2 G/DL (ref 2.8–4.4)
GLUCOSE BLD-MCNC: 103 MG/DL (ref 70–99)
GLUCOSE UR STRIP.AUTO-MCNC: NEGATIVE MG/DL
HCT VFR BLD AUTO: 41.3 %
HGB BLD-MCNC: 13.6 G/DL
IMM GRANULOCYTES # BLD AUTO: 0.02 X10(3) UL (ref 0–1)
IMM GRANULOCYTES NFR BLD: 0.4 %
KETONES UR STRIP.AUTO-MCNC: NEGATIVE MG/DL
LACTATE SERPL-SCNC: 0.7 MMOL/L (ref 0.4–2)
LYMPHOCYTES # BLD AUTO: 0.44 X10(3) UL (ref 1–4)
LYMPHOCYTES NFR BLD AUTO: 9.8 %
MCH RBC QN AUTO: 28.3 PG (ref 26–34)
MCHC RBC AUTO-ENTMCNC: 32.9 G/DL (ref 31–37)
MCV RBC AUTO: 86 FL
MONOCYTES # BLD AUTO: 0.47 X10(3) UL (ref 0.1–1)
MONOCYTES NFR BLD AUTO: 10.4 %
NEUTROPHILS # BLD AUTO: 3.54 X10 (3) UL (ref 1.5–7.7)
NEUTROPHILS # BLD AUTO: 3.54 X10(3) UL (ref 1.5–7.7)
NEUTROPHILS NFR BLD AUTO: 78.5 %
NITRITE UR QL STRIP.AUTO: NEGATIVE
OSMOLALITY SERPL CALC.SUM OF ELEC: 285 MOSM/KG (ref 275–295)
PH UR STRIP.AUTO: 5 [PH] (ref 5–8)
PLATELET # BLD AUTO: 137 10(3)UL (ref 150–450)
POTASSIUM SERPL-SCNC: 3.2 MMOL/L (ref 3.5–5.1)
PROT SERPL-MCNC: 7.3 G/DL (ref 6.4–8.2)
PROT UR STRIP.AUTO-MCNC: NEGATIVE MG/DL
RBC # BLD AUTO: 4.8 X10(6)UL
RBC UR QL AUTO: NEGATIVE
SARS-COV-2 RNA RESP QL NAA+PROBE: NOT DETECTED
SODIUM SERPL-SCNC: 136 MMOL/L (ref 136–145)
SP GR UR STRIP.AUTO: 1.01 (ref 1–1.03)
UROBILINOGEN UR STRIP.AUTO-MCNC: 0.2 MG/DL
WBC # BLD AUTO: 4.5 X10(3) UL (ref 4–11)
WBC CLUMPS UR QL AUTO: PRESENT /HPF

## 2023-06-24 PROCEDURE — 81001 URINALYSIS AUTO W/SCOPE: CPT | Performed by: EMERGENCY MEDICINE

## 2023-06-24 PROCEDURE — 85025 COMPLETE CBC W/AUTO DIFF WBC: CPT | Performed by: EMERGENCY MEDICINE

## 2023-06-24 PROCEDURE — 96361 HYDRATE IV INFUSION ADD-ON: CPT

## 2023-06-24 PROCEDURE — 70481 CT ORBIT/EAR/FOSSA W/DYE: CPT | Performed by: EMERGENCY MEDICINE

## 2023-06-24 PROCEDURE — 99285 EMERGENCY DEPT VISIT HI MDM: CPT

## 2023-06-24 PROCEDURE — 87086 URINE CULTURE/COLONY COUNT: CPT | Performed by: EMERGENCY MEDICINE

## 2023-06-24 PROCEDURE — 83605 ASSAY OF LACTIC ACID: CPT | Performed by: EMERGENCY MEDICINE

## 2023-06-24 PROCEDURE — 71046 X-RAY EXAM CHEST 2 VIEWS: CPT | Performed by: EMERGENCY MEDICINE

## 2023-06-24 PROCEDURE — 36415 COLL VENOUS BLD VENIPUNCTURE: CPT

## 2023-06-24 PROCEDURE — 80053 COMPREHEN METABOLIC PANEL: CPT | Performed by: EMERGENCY MEDICINE

## 2023-06-24 PROCEDURE — 87040 BLOOD CULTURE FOR BACTERIA: CPT | Performed by: EMERGENCY MEDICINE

## 2023-06-24 PROCEDURE — 81015 MICROSCOPIC EXAM OF URINE: CPT | Performed by: EMERGENCY MEDICINE

## 2023-06-24 PROCEDURE — 96365 THER/PROPH/DIAG IV INF INIT: CPT

## 2023-06-24 RX ORDER — AMOXICILLIN AND CLAVULANATE POTASSIUM 875; 125 MG/1; MG/1
1 TABLET, FILM COATED ORAL 2 TIMES DAILY
Qty: 20 TABLET | Refills: 0 | Status: SHIPPED | OUTPATIENT
Start: 2023-06-24 | End: 2023-07-04

## 2023-06-24 RX ORDER — AZITHROMYCIN 250 MG/1
500 TABLET, FILM COATED ORAL ONCE
Status: COMPLETED | OUTPATIENT
Start: 2023-06-24 | End: 2023-06-24

## 2023-06-24 RX ORDER — POTASSIUM CHLORIDE 20 MEQ/1
40 TABLET, EXTENDED RELEASE ORAL ONCE
Status: COMPLETED | OUTPATIENT
Start: 2023-06-24 | End: 2023-06-24

## 2023-06-24 RX ORDER — SODIUM CHLORIDE 9 MG/ML
INJECTION, SOLUTION INTRAVENOUS
Status: DISCONTINUED
Start: 2023-06-24 | End: 2023-06-24

## 2023-06-24 RX ORDER — CEFTRIAXONE 1 G/1
INJECTION, POWDER, FOR SOLUTION INTRAMUSCULAR; INTRAVENOUS
Status: DISCONTINUED
Start: 2023-06-24 | End: 2023-06-24

## 2023-06-24 RX ORDER — TOBRAMYCIN 3 MG/ML
1 SOLUTION/ DROPS OPHTHALMIC EVERY 4 HOURS
COMMUNITY

## 2023-06-24 RX ORDER — GENTAMICIN SULFATE 3 MG/ML
2 SOLUTION/ DROPS OPHTHALMIC
Qty: 5 ML | Refills: 0 | Status: SHIPPED | OUTPATIENT
Start: 2023-06-24 | End: 2023-06-29

## 2023-06-24 NOTE — ED INITIAL ASSESSMENT (HPI)
Redness/swelling to right upper and lower lid x 4 days. Currently on tobramycin. Stuffy/runny nose. No fever. Mild cough.

## 2023-06-25 NOTE — DISCHARGE INSTRUCTIONS
Take antibiotics as prescribed. Follow-up with ophthalmology on Monday. Stop previous antibiotic prescriptions. Follow-up with your PCP this week. Return if symptoms worsen or new symptoms develop.

## 2023-09-23 NOTE — ED PROVIDER NOTES
Patient Seen in: [facility] Emergency Department In [place]      History   Patient presents with:  Bleeding    Stated Complaint: bleeding after cutting left 5th digit nail-on coumadin    HPI    70-year-old male presents with bleeding from the fifth digit Erik   • Visual impairment               Past Surgical History:   Procedure Laterality Date   • ANGIOGRAM     • ANGIOPLASTY (CORONARY)      in the 1990s   • APPENDECTOMY     • BYPASS SURGERY  2006    [place], [place].  LIMA to LAD, SVG presents with bleeding from the fifth digit of the left hand. He is anticoagulated. Last Coumadin was checked a month ago. INR sent.   There is a nonbleeding wound to the dorsal aspect of the fifth digit of the left hand where he reports he is having int Improved

## 2024-06-26 ENCOUNTER — APPOINTMENT (OUTPATIENT)
Dept: MRI IMAGING | Facility: HOSPITAL | Age: 79
End: 2024-06-26
Attending: EMERGENCY MEDICINE

## 2024-06-26 ENCOUNTER — HOSPITAL ENCOUNTER (EMERGENCY)
Facility: HOSPITAL | Age: 79
Discharge: HOME | End: 2024-06-26
Attending: EMERGENCY MEDICINE

## 2024-06-26 VITALS
HEIGHT: 74 IN | TEMPERATURE: 97 F | RESPIRATION RATE: 18 BRPM | SYSTOLIC BLOOD PRESSURE: 133 MMHG | DIASTOLIC BLOOD PRESSURE: 76 MMHG | BODY MASS INDEX: 32.08 KG/M2 | WEIGHT: 250 LBS | OXYGEN SATURATION: 99 % | HEART RATE: 60 BPM

## 2024-06-26 DIAGNOSIS — H81.399 PERIPHERAL VERTIGO, UNSPECIFIED LATERALITY: Primary | ICD-10-CM

## 2024-06-26 LAB
ALBUMIN SERPL-MCNC: 3.8 G/DL (ref 3.4–5)
ALBUMIN/GLOB SERPL: 1 {RATIO} (ref 1–2)
ALP LIVER SERPL-CCNC: 94 U/L
ALT SERPL-CCNC: 23 U/L
ANION GAP SERPL CALC-SCNC: 6 MMOL/L (ref 0–18)
AST SERPL-CCNC: 15 U/L (ref 15–37)
BASOPHILS # BLD AUTO: 0.03 X10(3) UL (ref 0–0.2)
BASOPHILS NFR BLD AUTO: 0.6 %
BILIRUB SERPL-MCNC: 1.1 MG/DL (ref 0.1–2)
BUN BLD-MCNC: 16 MG/DL (ref 9–23)
CALCIUM BLD-MCNC: 8.8 MG/DL (ref 8.5–10.1)
CHLORIDE SERPL-SCNC: 104 MMOL/L (ref 98–112)
CO2 SERPL-SCNC: 28 MMOL/L (ref 21–32)
CREAT BLD-MCNC: 0.86 MG/DL
EGFRCR SERPLBLD CKD-EPI 2021: 90 ML/MIN/1.73M2 (ref 60–?)
EOSINOPHIL # BLD AUTO: 0.08 X10(3) UL (ref 0–0.7)
EOSINOPHIL NFR BLD AUTO: 1.5 %
ERYTHROCYTE [DISTWIDTH] IN BLOOD BY AUTOMATED COUNT: 13.8 %
GLOBULIN PLAS-MCNC: 3.7 G/DL (ref 2.8–4.4)
GLUCOSE BLD-MCNC: 114 MG/DL (ref 70–99)
HCT VFR BLD AUTO: 41.6 %
HGB BLD-MCNC: 13.9 G/DL
IMM GRANULOCYTES # BLD AUTO: 0.02 X10(3) UL (ref 0–1)
IMM GRANULOCYTES NFR BLD: 0.4 %
LYMPHOCYTES # BLD AUTO: 0.8 X10(3) UL (ref 1–4)
LYMPHOCYTES NFR BLD AUTO: 14.7 %
MCH RBC QN AUTO: 28.6 PG (ref 26–34)
MCHC RBC AUTO-ENTMCNC: 33.4 G/DL (ref 31–37)
MCV RBC AUTO: 85.6 FL
MONOCYTES # BLD AUTO: 0.49 X10(3) UL (ref 0.1–1)
MONOCYTES NFR BLD AUTO: 9 %
NEUTROPHILS # BLD AUTO: 4.02 X10 (3) UL (ref 1.5–7.7)
NEUTROPHILS # BLD AUTO: 4.02 X10(3) UL (ref 1.5–7.7)
NEUTROPHILS NFR BLD AUTO: 73.8 %
OSMOLALITY SERPL CALC.SUM OF ELEC: 288 MOSM/KG (ref 275–295)
PLATELET # BLD AUTO: 151 10(3)UL (ref 150–450)
POTASSIUM SERPL-SCNC: 3.4 MMOL/L (ref 3.5–5.1)
PROT SERPL-MCNC: 7.5 G/DL (ref 6.4–8.2)
RBC # BLD AUTO: 4.86 X10(6)UL
SODIUM SERPL-SCNC: 138 MMOL/L (ref 136–145)
WBC # BLD AUTO: 5.4 X10(3) UL (ref 4–11)

## 2024-06-26 PROCEDURE — 99284 EMERGENCY DEPT VISIT MOD MDM: CPT

## 2024-06-26 PROCEDURE — 93010 ELECTROCARDIOGRAM REPORT: CPT

## 2024-06-26 PROCEDURE — 93005 ELECTROCARDIOGRAM TRACING: CPT

## 2024-06-26 PROCEDURE — 85025 COMPLETE CBC W/AUTO DIFF WBC: CPT | Performed by: EMERGENCY MEDICINE

## 2024-06-26 PROCEDURE — 36415 COLL VENOUS BLD VENIPUNCTURE: CPT

## 2024-06-26 PROCEDURE — A9575 INJ GADOTERATE MEGLUMI 0.1ML: HCPCS | Performed by: EMERGENCY MEDICINE

## 2024-06-26 PROCEDURE — 70553 MRI BRAIN STEM W/O & W/DYE: CPT | Performed by: EMERGENCY MEDICINE

## 2024-06-26 PROCEDURE — 80053 COMPREHEN METABOLIC PANEL: CPT | Performed by: EMERGENCY MEDICINE

## 2024-06-26 RX ORDER — GADOTERATE MEGLUMINE 376.9 MG/ML
20 INJECTION INTRAVENOUS
Status: COMPLETED | OUTPATIENT
Start: 2024-06-26 | End: 2024-06-26

## 2024-06-26 RX ORDER — MECLIZINE HYDROCHLORIDE 25 MG/1
25 TABLET ORAL 3 TIMES DAILY PRN
Qty: 20 TABLET | Refills: 0 | Status: SHIPPED | OUTPATIENT
Start: 2024-06-26

## 2024-06-26 NOTE — ED QUICK NOTES
Rounding Completed    Plan of Care reviewed. Waiting for MRI results.  Elimination needs assessed.  Provided update that we are still waiting for MRI results. Denies any further needs at this time.    Bed is locked and in lowest position. Call light within reach.

## 2024-06-26 NOTE — ED INITIAL ASSESSMENT (HPI)
Patient reports 2 nights ago he was laying down in bed and started to have pain in the back of his head, moved his head to the left and pain stops, then went to stand up and felt dizzy and off balance. Episode lasted 1 hour. Patient then reports same scenario last night and and again this morning. Currently denies pain. Does endorse some foul smelling urine, but no dysuria. Face symmetrical, no arm or leg weakness in triage, no slurred speech. Denies vision loss.

## 2024-06-26 NOTE — ED PROVIDER NOTES
Patient Seen in: [facility] Emergency Department      History     Chief Complaint   Patient presents with    Dizziness     ONSET TWO DAYS AGO.     Stated Complaint: DIZZINESS    Subjective:     HPI    78-year-old male with multiple medical problems who complains of pain in his right occipital notch. The pain did not radiate up or down and was absent when he turned his head in one direction. The same pain was experienced again the previous night. In addition to the pain, the patient reported waking up feeling vertiginous for the past two mornings. It  was so severe that he almost fell out of bed. After finally getting up, he described a sensation of everything spinning, which lasted for about 20 to 30 minutes. He compared the sensation to being on a cruise ship.     The patient also mentioned a previous incident where he was treated for a stroke. However, it was later debated whether it was indeed a stroke as he exhibited all the symptoms but the scans did not show any signs of a stroke. During that time, he underwent outpatient occupational therapy due to a lack of control in his hands, which he described as uncoordinated. on the table. The patient declined the surgery. The patient's symptoms of pain and dizziness prompted his primary care physician to advise him to visit the emergency room immediately.    Objective:   Past Medical History:    Anxiety    Arrhythmia    afib    Atherosclerosis of coronary artery    BPH (benign prostatic hyperplasia)    CAD (coronary artery disease)    Colitis    Congestive heart disease (HCC)    Coronary atherosclerosis    Deep vein thrombosis (HCC)    H/O heart bypass surgery    Hearing impairment    L > R    Heart attack (HCC)    Heart murmur    High blood pressure    History of blood clots    History of cardiac arrest    History of DVT of lower extremity    April 2014    Hypertension    Hyperthyroidism    ICH (intracerebral hemorrhage) (HCC)    Migraines    Muscle weakness     uses cane    Other and unspecified hyperlipidemia    Pneumonia, organism unspecified(486)    Psychiatric disorder    Pulmonary embolism (HCC)    , , 3/2014    Recurrent major depressive disorder, in full remission (HCC)    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  LIMA to LAD, SVG to D1, SVG to D2    Cabg      Colonoscopy      Dr. Wang, at [facility], polyps otherwise negative.     Other surgical history      right index finger sepsis s/p wart removal     Other surgical history  10/20/2020    Cystoscopy Dr. Rojo    Tonsillectomy                  Social History     Socioeconomic History    Marital status:    Occupational History    Occupation: senior      Employer: [employer]     Comment: Retired healthcare professional   Tobacco Use    Smoking status: Former     Types: Cigars     Quit date: 1977     Years since quittin.0    Smokeless tobacco: Never   Vaping Use    Vaping status: Never Used   Substance and Sexual Activity    Alcohol use: Yes     Alcohol/week: 1.0 - 2.0 standard drink of alcohol     Types: 1 - 2 Glasses of wine per week    Drug use: No    Sexual activity: Not Currently     Partners: Female   Other Topics Concern    Exercise Yes     Comment: Walks 3 times per week, and swims    Seat Belt Yes   Social History Narrative         Children    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    Currently a Deacon in [employer]    Worked as a hospital executive     Social Determinants of Health      Received from [facility]    Overall Financial Resource Strain (CARDIA)     Received from [facility]    Hunger Vital Sign    Received from [facility]    PRAPARE - Transportation    Received from [facility]    Exercise Vital Sign    Received from [facility]    [place] of Occup[employer] - Occupational Stress Questionnaire    Received from [facility]    Social Connection and Isolation Panel [NHANES]    Received from [facility]    Housing Stability Vital Sign              Review of Systems    Positive for stated complaint: DIZZINESS  Other systems are as noted in HPI.  Constitutional and vital signs reviewed.      All other systems reviewed and negative except as noted above.    Physical Exam     ED Triage Vitals [06/26/24 0855]   /75   Pulse 60   Resp 18   Temp 97.4 °F (36.3 °C)   Temp src Temporal   SpO2 97 %   O2 Device None (Room air)       Current:/76   Pulse 60   Temp 97.4 °F (36.3 °C) (Temporal)   Resp 18   Ht 188 cm (6' 2\")   Wt 113.4 kg   SpO2 99%   BMI 32.10 kg/m²       General:  Vitals as listed.  No acute distress   HEENT: Sclerae anicteric.  Conjunctivae show no pallor.  Oropharynx clear, mucous membranes moist   Lungs: good air exchange   Extremities: no edema, normal peripheral pulses   Neuro: Alert oriented and nonfocal.  No nystagmus      ED Course     Labs Reviewed   COMP METABOLIC PANEL (14) - Abnormal; Notable for the following components:       Result Value    Glucose 114 (*)     Potassium 3.4 (*)     All other components within normal limits   CBC W/ DIFFERENTIAL - Abnormal; Notable for the following components:    Lymphocyte Absolute 0.80 (*)     All other components within normal limits   CBC WITH DIFFERENTIAL WITH PLATELET    Narrative:     The following orders were created for panel order CBC With Differential With Platelet.  Procedure                               Abnormality         Status                     ---------                               -----------         ------                      CBC W/ DIFFERENTIAL[[id number]]          Abnormal            Final result                 Please view results for these tests on the individual orders.   RAINBOW DRAW LAVENDER   RAINBOW DRAW LIGHT GREEN   RAINBOW DRAW BLUE   RAINBOW DRAW GOLD     MRI BRAIN (W+WO) (CPT=70553)    Result Date: 6/26/2024  CONCLUSION:   1. No evidence of an acute infarct.  2. Global parenchymal volume loss.  3. No enhancing lesions.    LOCATION:  [facility]    Dictated by (CST): Deepak Crawford MD on 6/26/2024 at 12:03 PM     Finalized by (CST): Deepak Crawford MD on 6/26/2024 at 12:18 PM        EKG    Rate, intervals and axes as noted on EKG Report.  Rate: 66  Rhythm: Sinus Rhythm  Reading: Pre-existing right bundle branch block and no other acute ST-T changes compared to EKG done 4/6/2023           ED COURSE and MDM     Sources of the medical history included the patient and his wife who accompanies him    Differential diagnosis before testing included vertigo is peripheral versus cerebellar infarct/bleed, a medical condition that poses a threat to life.    I reviewed prior external notes including CT scan of the brain that was done 7/2/2022 that showed chronic small vessel ischemic changes and atrophy    Prescribed meclizine    I have discussed with the patient the results of testing, differential diagnosis, and treatment plan. They expressed clear understanding of these instructions and agrees to the plan provided.    Disposition and Plan     Clinical Impression:  1. Peripheral vertigo, unspecified laterality         Disposition:  Discharge  6/26/2024 12:52 pm    Follow-up:  Jd Lovell MD  [address] [place] [address] [id number] [phone]    Schedule an appointment as soon as possible for a visit in 3 day(s)          Medications Prescribed:  Discharge Medication List as of 6/26/2024 12:59 PM        START taking these medications    Details   meclizine 25 MG Oral Tab Take 1 tablet (25 mg total) by  mouth 3 (three) times daily as needed., Normal, Disp-20 tablet, R-0

## 2024-06-27 LAB
ATRIAL RATE: 66 BPM
P AXIS: 86 DEGREES
P-R INTERVAL: 240 MS
Q-T INTERVAL: 478 MS
QRS DURATION: 164 MS
QTC CALCULATION (BEZET): 501 MS
R AXIS: 80 DEGREES
T AXIS: 76 DEGREES
VENTRICULAR RATE: 66 BPM

## 2024-11-20 ENCOUNTER — HOSPITAL ENCOUNTER (OUTPATIENT)
Facility: HOSPITAL | Age: 79
Setting detail: OBSERVATION
Discharge: HOME | End: 2024-11-22
Attending: EMERGENCY MEDICINE | Admitting: INTERNAL MEDICINE
Payer: MEDICARE

## 2024-11-20 ENCOUNTER — APPOINTMENT (OUTPATIENT)
Dept: GENERAL RADIOLOGY | Facility: HOSPITAL | Age: 79
End: 2024-11-20
Attending: EMERGENCY MEDICINE
Payer: MEDICARE

## 2024-11-20 DIAGNOSIS — R07.9 ACUTE CHEST PAIN: Primary | ICD-10-CM

## 2024-11-20 LAB
ALBUMIN SERPL-MCNC: 4.4 G/DL (ref 3.2–4.8)
ALBUMIN/GLOB SERPL: 1.7 {RATIO} (ref 1–2)
ALP LIVER SERPL-CCNC: 84 U/L
ALT SERPL-CCNC: <7 U/L
ANION GAP SERPL CALC-SCNC: 4 MMOL/L (ref 0–18)
APTT PPP: 31.3 SECONDS (ref 23–36)
AST SERPL-CCNC: 11 U/L (ref ?–34)
ATRIAL RATE: 57 BPM
BASOPHILS # BLD AUTO: 0.02 X10(3) UL (ref 0–0.2)
BASOPHILS NFR BLD AUTO: 0.3 %
BILIRUB SERPL-MCNC: 1.3 MG/DL (ref 0.2–1.1)
BUN BLD-MCNC: 12 MG/DL (ref 9–23)
CALCIUM BLD-MCNC: 9.6 MG/DL (ref 8.7–10.4)
CHLORIDE SERPL-SCNC: 104 MMOL/L (ref 98–112)
CK SERPL-CCNC: 30 U/L
CK SERPL-CCNC: 31 U/L
CO2 SERPL-SCNC: 31 MMOL/L (ref 21–32)
CREAT BLD-MCNC: 0.82 MG/DL
EGFRCR SERPLBLD CKD-EPI 2021: 90 ML/MIN/1.73M2 (ref 60–?)
EOSINOPHIL # BLD AUTO: 0.05 X10(3) UL (ref 0–0.7)
EOSINOPHIL NFR BLD AUTO: 0.8 %
ERYTHROCYTE [DISTWIDTH] IN BLOOD BY AUTOMATED COUNT: 13.8 %
GLOBULIN PLAS-MCNC: 2.6 G/DL (ref 2–3.5)
GLUCOSE BLD-MCNC: 107 MG/DL (ref 70–99)
HCT VFR BLD AUTO: 39.2 %
HGB BLD-MCNC: 13.1 G/DL
IMM GRANULOCYTES # BLD AUTO: 0.02 X10(3) UL (ref 0–1)
IMM GRANULOCYTES NFR BLD: 0.3 %
INR BLD: 1.56 (ref 0.8–1.2)
LYMPHOCYTES # BLD AUTO: 0.73 X10(3) UL (ref 1–4)
LYMPHOCYTES NFR BLD AUTO: 12.2 %
MCH RBC QN AUTO: 28.2 PG (ref 26–34)
MCHC RBC AUTO-ENTMCNC: 33.4 G/DL (ref 31–37)
MCV RBC AUTO: 84.5 FL
MONOCYTES # BLD AUTO: 0.57 X10(3) UL (ref 0.1–1)
MONOCYTES NFR BLD AUTO: 9.6 %
NEUTROPHILS # BLD AUTO: 4.57 X10 (3) UL (ref 1.5–7.7)
NEUTROPHILS # BLD AUTO: 4.57 X10(3) UL (ref 1.5–7.7)
NEUTROPHILS NFR BLD AUTO: 76.8 %
OSMOLALITY SERPL CALC.SUM OF ELEC: 288 MOSM/KG (ref 275–295)
P AXIS: -11 DEGREES
P-R INTERVAL: 242 MS
PLATELET # BLD AUTO: 157 10(3)UL (ref 150–450)
POTASSIUM SERPL-SCNC: 3.4 MMOL/L (ref 3.5–5.1)
PROT SERPL-MCNC: 7 G/DL (ref 5.7–8.2)
PROTHROMBIN TIME: 18.8 SECONDS (ref 11.6–14.8)
Q-T INTERVAL: 472 MS
QRS DURATION: 154 MS
QTC CALCULATION (BEZET): 459 MS
R AXIS: 89 DEGREES
RBC # BLD AUTO: 4.64 X10(6)UL
SODIUM SERPL-SCNC: 139 MMOL/L (ref 136–145)
T AXIS: 92 DEGREES
TROPONIN I SERPL HS-MCNC: 18 NG/L
TROPONIN I SERPL HS-MCNC: 22 NG/L
TROPONIN I SERPL HS-MCNC: 22 NG/L
TROPONIN I SERPL HS-MCNC: 25 NG/L
VENTRICULAR RATE: 57 BPM
WBC # BLD AUTO: 6 X10(3) UL (ref 4–11)

## 2024-11-20 PROCEDURE — 99285 EMERGENCY DEPT VISIT HI MDM: CPT

## 2024-11-20 PROCEDURE — 85025 COMPLETE CBC W/AUTO DIFF WBC: CPT | Performed by: EMERGENCY MEDICINE

## 2024-11-20 PROCEDURE — 96376 TX/PRO/DX INJ SAME DRUG ADON: CPT

## 2024-11-20 PROCEDURE — 85730 THROMBOPLASTIN TIME PARTIAL: CPT | Performed by: EMERGENCY MEDICINE

## 2024-11-20 PROCEDURE — 84484 ASSAY OF TROPONIN QUANT: CPT | Performed by: EMERGENCY MEDICINE

## 2024-11-20 PROCEDURE — 82550 ASSAY OF CK (CPK): CPT | Performed by: INTERNAL MEDICINE

## 2024-11-20 PROCEDURE — 96372 THER/PROPH/DIAG INJ SC/IM: CPT

## 2024-11-20 PROCEDURE — 80053 COMPREHEN METABOLIC PANEL: CPT | Performed by: EMERGENCY MEDICINE

## 2024-11-20 PROCEDURE — 85610 PROTHROMBIN TIME: CPT | Performed by: EMERGENCY MEDICINE

## 2024-11-20 PROCEDURE — 96374 THER/PROPH/DIAG INJ IV PUSH: CPT

## 2024-11-20 PROCEDURE — 84484 ASSAY OF TROPONIN QUANT: CPT | Performed by: HOSPITALIST

## 2024-11-20 PROCEDURE — 36415 COLL VENOUS BLD VENIPUNCTURE: CPT | Performed by: INTERNAL MEDICINE

## 2024-11-20 PROCEDURE — 93005 ELECTROCARDIOGRAM TRACING: CPT

## 2024-11-20 PROCEDURE — 71045 X-RAY EXAM CHEST 1 VIEW: CPT | Performed by: EMERGENCY MEDICINE

## 2024-11-20 PROCEDURE — 93010 ELECTROCARDIOGRAM REPORT: CPT

## 2024-11-20 PROCEDURE — 84484 ASSAY OF TROPONIN QUANT: CPT | Performed by: INTERNAL MEDICINE

## 2024-11-20 RX ORDER — ACETAMINOPHEN 500 MG
500 TABLET ORAL EVERY 4 HOURS PRN
Status: DISCONTINUED | OUTPATIENT
Start: 2024-11-20 | End: 2024-11-22

## 2024-11-20 RX ORDER — MORPHINE SULFATE 4 MG/ML
4 INJECTION, SOLUTION INTRAMUSCULAR; INTRAVENOUS EVERY 30 MIN PRN
Status: ACTIVE | OUTPATIENT
Start: 2024-11-20 | End: 2024-11-20

## 2024-11-20 RX ORDER — SODIUM CHLORIDE 9 MG/ML
INJECTION, SOLUTION INTRAVENOUS
Status: COMPLETED | OUTPATIENT
Start: 2024-11-21 | End: 2024-11-21

## 2024-11-20 RX ORDER — MORPHINE SULFATE 4 MG/ML
4 INJECTION, SOLUTION INTRAMUSCULAR; INTRAVENOUS ONCE
Status: COMPLETED | OUTPATIENT
Start: 2024-11-20 | End: 2024-11-20

## 2024-11-20 RX ORDER — ALPRAZOLAM 0.25 MG/1
0.25 TABLET ORAL 3 TIMES DAILY PRN
Status: DISCONTINUED | OUTPATIENT
Start: 2024-11-20 | End: 2024-11-22

## 2024-11-20 RX ORDER — ATORVASTATIN CALCIUM 40 MG/1
40 TABLET, FILM COATED ORAL NIGHTLY
Status: DISCONTINUED | OUTPATIENT
Start: 2024-11-21 | End: 2024-11-22

## 2024-11-20 RX ORDER — FAMOTIDINE 20 MG/1
40 TABLET, FILM COATED ORAL DAILY
Status: DISCONTINUED | OUTPATIENT
Start: 2024-11-21 | End: 2024-11-22

## 2024-11-20 RX ORDER — HYDROCHLOROTHIAZIDE 25 MG/1
25 TABLET ORAL DAILY
Status: DISCONTINUED | OUTPATIENT
Start: 2024-11-21 | End: 2024-11-22

## 2024-11-20 RX ORDER — ASPIRIN 81 MG/1
81 TABLET, CHEWABLE ORAL ONCE
Status: COMPLETED | OUTPATIENT
Start: 2024-11-20 | End: 2024-11-20

## 2024-11-20 RX ORDER — HEPARIN SODIUM 5000 [USP'U]/ML
5000 INJECTION, SOLUTION INTRAVENOUS; SUBCUTANEOUS EVERY 12 HOURS SCHEDULED
Status: DISCONTINUED | OUTPATIENT
Start: 2024-11-20 | End: 2024-11-22

## 2024-11-20 RX ORDER — HYDROCHLOROTHIAZIDE 25 MG/1
25 TABLET ORAL DAILY
COMMUNITY
Start: 2024-10-17

## 2024-11-20 RX ORDER — SERTRALINE HYDROCHLORIDE 100 MG/1
200 TABLET, FILM COATED ORAL DAILY
Status: DISCONTINUED | OUTPATIENT
Start: 2024-11-21 | End: 2024-11-22

## 2024-11-20 RX ORDER — HEPARIN SODIUM AND DEXTROSE 10000; 5 [USP'U]/100ML; G/100ML
1000 INJECTION INTRAVENOUS ONCE
Status: DISCONTINUED | OUTPATIENT
Start: 2024-11-20 | End: 2024-11-20

## 2024-11-20 RX ORDER — MORPHINE SULFATE 2 MG/ML
INJECTION, SOLUTION INTRAMUSCULAR; INTRAVENOUS
Status: COMPLETED
Start: 2024-11-20 | End: 2024-11-20

## 2024-11-20 RX ORDER — LEVETIRACETAM 500 MG/1
500 TABLET ORAL 2 TIMES DAILY
Status: DISCONTINUED | OUTPATIENT
Start: 2024-11-20 | End: 2024-11-22

## 2024-11-20 RX ORDER — ASPIRIN 81 MG/1
81 TABLET, CHEWABLE ORAL DAILY
Status: DISCONTINUED | OUTPATIENT
Start: 2024-11-21 | End: 2024-11-22

## 2024-11-20 RX ORDER — HEPARIN SODIUM AND DEXTROSE 10000; 5 [USP'U]/100ML; G/100ML
INJECTION INTRAVENOUS CONTINUOUS
Status: DISCONTINUED | OUTPATIENT
Start: 2024-11-20 | End: 2024-11-20

## 2024-11-20 RX ORDER — FINASTERIDE 5 MG/1
5 TABLET, FILM COATED ORAL DAILY
Status: DISCONTINUED | OUTPATIENT
Start: 2024-11-21 | End: 2024-11-22

## 2024-11-20 RX ORDER — NITROGLYCERIN 0.4 MG/1
0.4 TABLET SUBLINGUAL EVERY 5 MIN PRN
COMMUNITY
Start: 2024-11-19 | End: 2024-11-22

## 2024-11-20 RX ORDER — LEVOTHYROXINE SODIUM 100 UG/1
100 TABLET ORAL
Status: DISCONTINUED | OUTPATIENT
Start: 2024-11-21 | End: 2024-11-22

## 2024-11-20 NOTE — ED INITIAL ASSESSMENT (HPI)
Pt is scheduled for angiogram on Friday, coming to ED today for chest pain since last night that was relieved with nitroglycerin. Today got up with pain to back of neck and both of his shoulders. Took another nitroglycerin then but pain is not relieved. EMS gave 324 ASA PTA. Currently rates pain 5/10.

## 2024-11-20 NOTE — ED QUICK NOTES
Orders for admission, patient is aware of plan and ready to go upstairs. Any questions, please call ED NEFTALY Lopes at extension [phone].     Patient Covid vaccination status: Fully vaccinated     COVID Test Ordered in ED: None    COVID Suspicion at Admission: N/A    Running Infusions:  None    Mental Status/LOC at time of transport: A&O x 4    Other pertinent information:     CIWA score: N/A   NIH score:  N/A

## 2024-11-20 NOTE — H&P
[facility] H&P/Consult note       CC:   Chief Complaint   Patient presents with    Chest Pain Angina        PCP: Jd Lovell MD    History of Present Illness: Patient is a 79 year old male with PMH sig for CAD with prior stent/ emergent CABG, HTN, HLD, ischemic CM, PAF, DVT / PE hx on AC, here for chest pain  Has had intermittent chest pain wo any specific triggers, usually occurring about once weekly. Was seem by his cardiologist and plan was to have angiogram this Friday. He presented today to the hosptial due to more frequent pain, last night, relieved with nitroglycein but this am did not respond. No sob, dizziness, no n/v (had nausea with aroud time of pain medications). No f/c. No cough but has noted that he needs to clear his throat.     PMH  Past Medical History:    Anxiety    Arrhythmia    afib    Atherosclerosis of coronary artery    BPH (benign prostatic hyperplasia)    CAD (coronary artery disease)    Colitis    Congestive heart disease (HCC)    Coronary atherosclerosis    Deep vein thrombosis (HCC)    H/O heart bypass surgery    Hearing impairment    L > R    Heart attack (HCC)    Heart murmur    High blood pressure    History of blood clots    History of cardiac arrest    History of DVT of lower extremity    April 2014    Hypertension    Hyperthyroidism    ICH (intracerebral hemorrhage) (HCC)    Migraines    Muscle weakness    uses cane    Other and unspecified hyperlipidemia    Pneumonia, organism unspecified(486)    Psychiatric disorder    Pulmonary embolism (HCC)    2008, 2010, 3/2014    Recurrent major depressive disorder, in full remission (HCC)    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  LIMA to LAD, SVG to D1, SVG to D2    Cabg      Colonoscopy      Dr. Wang, at [facility], polyps otherwise negative.     Other surgical history      right index finger sepsis s/p wart removal     Other surgical history  10/20/2020    Cystoscopy Dr. Rojo    Tonsillectomy          ALL:  Allergies[1]     Home Medications:  Medications Taking[2]      Soc Hx  Social History     Tobacco Use    Smoking status: Former     Types: Cigars     Quit date: 1977     Years since quittin.4    Smokeless tobacco: Never   Substance Use Topics    Alcohol use: Not Currently     Alcohol/week: 1.0 - 2.0 standard drink of alcohol     Types: 1 - 2 Glasses of wine per week        Fam Hx  Family History   Problem Relation Age of Onset    Heart Disease Father         late 60s    Heart Disorder Father     Dementia Father         Parkinson's Disease    Cancer Father         colon cancer diagnosed at 83    Stroke Mother        Review of Systems  Comprehensive ROS reviewed and negative except for what's stated above.         OBJECTIVE:  /76   Pulse 50   Temp 97.9 °F (36.6 °C) (Temporal)   Resp 19   Ht 6' 2\" (1.88 m)   Wt 250 lb (113.4 kg)   SpO2 97%   BMI 32.10 kg/m²   General:  Alert, no distress, appears stated age.   Head:  Normocephalic,    Eyes:  Sclera anicteric    Throat: MMM   Neck: Supple,    Lungs:   Clear to auscultation bilaterally. Normal effort   Chest wall:  No tenderness or deformity.   Heart:  Regular rate and rhythm    Abdomen:   Soft, NT/ND, +bs   Extremities: Atraumatic,  chronic mild edema   Skin: No visible rashes or lesions.    Neurologic: No facial asymmetry, no dysarthria, moving extremities      Diagnostic Data:    CBC/Chem  Recent Labs   Lab 24  0932   WBC 6.0   HGB 13.1   MCV 84.5   .0   INR 1.56*       Recent Labs   Lab 24  0932      K 3.4*      CO2 31.0   BUN 12   CREATSERUM 0.82   *   CA 9.6        Recent Labs   Lab 11/20/24  0932   ALT <7*   AST 11   ALB 4.4       No results for input(s): \"TROP\" in the last 168 hours.    Additional Diagnostics: ECG: sinus rhythm    CXR: image personally reviewed     Radiology: XR CHEST AP PORTABLE  (CPT=71045)    Result Date: 11/20/2024  PROCEDURE:  XR CHEST AP PORTABLE  (CPT=71045)  TECHNIQUE:  AP chest radiograph was obtained.  COMPARISON:  [facility], XR, XR CHEST PA + LAT CHEST (CPT=71046), 6/24/2023, 4:33 PM.  INDICATIONS:  Chest pain  PATIENT STATED HISTORY: (As transcribed by Technologist)  Patient has midline chest pain since last night and says today he woke up with pain in bilateral shoulders and the back of his neck. History of triple bypass surgery, and is scheduled for angioplasty in the next week.               CONCLUSION:   Postoperative changes of CABG with stable cardiac and mediastinal contours.  Subsegmental bibasilar atelectasis or scar without pulmonary edema or focal airspace consolidation.  The pleural spaces are clear.   LOCATION:  [facility]      Dictated by (CST): Luz Nguyen MD on 11/20/2024 at 10:17 AM     Finalized by (CST): Luz Nguyen MD on 11/20/2024 at 10:17 AM          ASSESSMENT / PLAN:     Patient is a 79 year old male with PMH sig for CAD with prior stent/ emergent CABG, HTN, HLD, ischemic CM, PAF, DVT / PE hx on AC, here for chest pain    Impression    -intermittent chest pain  -CAD with prior PCI / CABG  -HTN  -HLD  -ischemic CM   -pAF    -DVT / PE hx on chronic AC  -JOSÉ MANUEL  -obesity  -hypothyroidism    -seizure do    Plan    *CV  -serial trop  -tele  -plan for angiogram on Friday after xarelto washout  -aspirin  -statin    *Pulm  -on chronic AC for prior VTE  -switch to heparin drip while off AC  -cpap     *chronic conditions - home meds as able    Addnedum  Dw cards, hold heparin drip    Further recommendations pending patient's clinical course. [facility] hospitalist to continue to follow patient while in house    Patient and/or patient's  family given opportunity to ask questions and note understanding and agreeing with therapeutic plan as outlined    Sergio Ramos Hospitalist  [phone]  Answering Service: [phone]           [1]   Allergies  Allergen Reactions    Demerol [Meperidine Hcl] OTHER (SEE COMMENTS)     \"Loss of psychomotor skills and vomiting\"    Meperidine OTHER (SEE COMMENTS)     \"Loss of psychomotor skills and vomiting\"   [2]   Outpatient Medications Marked as Taking for the 11/20/24 encounter (Hospital Encounter)   Medication Sig Dispense Refill    meclizine 25 MG Oral Tab Take 1 tablet (25 mg total) by mouth 3 (three) times daily as needed. 20 tablet 0    tobramycin 0.3 % Ophthalmic Solution Place 1 drop into the right eye every 4 (four) hours.      rivaroxaban (XARELTO) 10 MG Oral Tab Take 1 tablet (10 mg total) by mouth. 5/26/23: per Pt to stop 48 hours before procedure- per Dr. Guzman      aspirin 81 MG Oral Chew Tab Chew 1 tablet (81 mg total) by mouth every other day.      levETIRAcetam 500 MG Oral Tab Take 1 tablet (500 mg total) by mouth 2 (two) times daily.      atorvastatin 40 MG Oral Tab Take 1 tablet (40 mg total) by mouth daily.      SERTRALINE 100 MG Oral Tab TAKE TWO TABLETS BY MOUTH ONCE DAILY 180 tablet 0    ALPRAZolam 0.25 MG Oral Tab Take 1 tablet (0.25 mg total) by mouth 3 (three) times daily as needed. 30 tablet 0    FINASTERIDE 5 MG Oral Tab Take 1 tablet (5 mg total) by mouth daily. 90 tablet 0    potassium chloride 20 MEQ Oral Tab CR Take 1 tablet (20 mEq total) by mouth 2 (two) times daily. 180 tablet 1    LEVOTHYROXINE 100 MCG Oral Tab TAKE ONE TABLET BY MOUTH ONCE DAILY 90 tablet 0    famotidine 40 MG Oral Tab Take 1 tablet (40 mg total) by mouth daily. 90 tablet 3

## 2024-11-20 NOTE — ED PROVIDER NOTES
Patient Seen in: [facility] Emergency Department      History     Chief Complaint   Patient presents with    Chest Pain Angina     Stated Complaint: CP    Subjective:   HPI      79-year-old male presents with chest pain.  History of previous CABG, multiple stents.  Scheduled for catheterization in 2 days.  He says over the last 12 hours his chest pain has been more severe.  Typically it would resolve spontaneously.  Last night he developed the discomfort and took nitroglycerin which resolved the pain.  This morning the pain returned and is not improving with nitroglycerin.  He states that this pain feels similar to the pains he used to get before his bypass surgery.    Objective:     Past Medical History:    Anxiety    Arrhythmia    afib    Atherosclerosis of coronary artery    BPH (benign prostatic hyperplasia)    CAD (coronary artery disease)    Colitis    Congestive heart disease (HCC)    Coronary atherosclerosis    Deep vein thrombosis (HCC)    H/O heart bypass surgery    Hearing impairment    L > R    Heart attack (HCC)    Heart murmur    High blood pressure    History of blood clots    History of cardiac arrest    History of DVT of lower extremity    April 2014    Hypertension    Hyperthyroidism    ICH (intracerebral hemorrhage) (HCC)    Migraines    Muscle weakness    uses cane    Other and unspecified hyperlipidemia    Pneumonia, organism unspecified(486)    Psychiatric disorder    Pulmonary embolism (HCC)    2008, 2010, 3/2014    Recurrent major depressive disorder, in full remission (HCC)    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   LIMA to LAD, SVG to D1, SVG to D2    Cabg      Colonoscopy      Dr. Wang, at [facility], polyps otherwise negative.     Other surgical history      right index finger sepsis s/p wart removal     Other surgical history  10/20/2020    Cystoscopy Dr. Rojo    Tonsillectomy                  Social History     Socioeconomic History    Marital status:    Occupational History    Occupation: senior      Employer: [employer]     Comment: Retired healthcare professional   Tobacco Use    Smoking status: Former     Types: Cigars     Quit date: 1977     Years since quittin.4    Smokeless tobacco: Never   Vaping Use    Vaping status: Never Used   Substance and Sexual Activity    Alcohol use: Not Currently     Alcohol/week: 1.0 - 2.0 standard drink of alcohol     Types: 1 - 2 Glasses of wine per week    Drug use: No    Sexual activity: Not Currently     Partners: Female   Other Topics Concern    Exercise Yes     Comment: Walks 3 times per week, and swims    Seat Belt Yes   Social History Narrative         Children    Former [employer]ism Brother then  wife who was a nun    Currently a Deacon in [employer]    Worked as a hospital executive     Social Drivers of Health      Received from [facility]    Overall Financial Resource Strain (CARDIA)    Received from [facility]    Hunger Vital Sign    Received from [facility]    PRAPARE - Transportation    Received from [facility]    Exercise Vital Sign    Received from [facility]    [place] [place] of Occupational Health - Occupational Stress Questionnaire    Received from [facility]    Social Connection and Isolation Panel [NHANES]    Received from [facility]    Housing Stability Vital Sign                  Physical Exam     ED Triage Vitals [24 0924]   /71   Pulse 60   Resp 18   Temp 97.9 °F (36.6 °C)   Temp src Temporal   SpO2 98 %   O2 Device None (Room air)        Current Vitals:   Vital Signs  BP: 131/78  Pulse: 53  Resp: 20  Temp: 97.9 °F (36.6 °C)  Temp src: Temporal  MAP (mmHg): 94    Oxygen Therapy  SpO2: 94 %  O2 Device: None (Room air)        Physical Exam  General:  Vitals as listed.  No acute distress   HEENT: Sclerae anicteric.  Conjunctivae show no pallor.  Oropharynx clear, mucous membranes moist   Neck: supple, no rigidity   Lungs: good air exchange and clear   Heart: regular rate rhythm and no murmur   Abdomen: Soft and nontender.  No abdominal masses.  No peritoneal signs   Extremities: no edema, normal peripheral pulses   Neuro: Alert oriented and nonfocal   Skin: no rashes or nodules    ED Course     Labs Reviewed   CBC WITH DIFFERENTIAL WITH PLATELET - Abnormal; Notable for the following components:       Result Value    Lymphocyte Absolute 0.73 (*)     All other components within normal limits   COMP METABOLIC PANEL (14) - Abnormal; Notable for the following components:    Glucose 107 (*)     Potassium 3.4 (*)     ALT <7 (*)     Bilirubin, Total 1.3 (*)     All other components within normal limits   PROTHROMBIN TIME (PT) - Abnormal; Notable for the following components:    PT 18.8 (*)     INR 1.56 (*)     All other components within normal limits   TROPONIN I HIGH SENSITIVITY - Normal   PTT, ACTIVATED - Normal   RAINBOW DRAW LAVENDER   RAINBOW DRAW LIGHT GREEN   RAINBOW DRAW BLUE     EKG    Rate, intervals and axes as noted on EKG Report.  Rate: 57  Rhythm: Sinus Rhythm  Reading: Sinus bradycardia.  No ST elevation MI.  Unchanged when compared to June 26, 2024                XR CHEST AP PORTABLE  (CPT=71045)    Result Date: 11/20/2024  PROCEDURE:  XR CHEST AP PORTABLE  (CPT=71045)  TECHNIQUE:  AP chest radiograph was obtained.  COMPARISON:  [facility], XR, XR CHEST PA + LAT CHEST (CPT=71046), 6/24/2023, 4:33 PM.  INDICATIONS:  Chest pain  PATIENT STATED HISTORY: (As transcribed by Technologist)  Patient has midline chest pain since last night and  says today he woke up with pain in bilateral shoulders and the back of his neck. History of triple bypass surgery, and is scheduled for angioplasty in the next week.               CONCLUSION:   Postoperative changes of CABG with stable cardiac and mediastinal contours.  Subsegmental bibasilar atelectasis or scar without pulmonary edema or focal airspace consolidation.  The pleural spaces are clear.   LOCATION:  [facility]      Dictated by (CST): Luz Nguyen MD on 11/20/2024 at 10:17 AM     Finalized by (CST): Luz Nguyen MD on 11/20/2024 at 10:17 AM             MDM      79-year-old male with CAD presents with chest pain not improved with nitroglycerin.  Began having the pain last night and originally the nitroglycerin helped but no longer is    Additional history obtained by [facility] clinic documentation from last week reports that patient is scheduled for a catheterization in 2 days due to worsening symptoms concerning for cardiogenic chest pain.    Differential includes but is not limited to cardiac ischemia, CAD, angina, musculoskeletal, a life threat.    CBC, CMP, troponin, chest x-ray, EKG ordered for further evaluation.  He reports no improvement with multiple rounds of nitroglycerin at home this morning.  Morphine 4 mg IV ordered for pain.  EKG does not show ST elevation MI at this time.  Plan will be for hospitalization due to worsening symptoms and patient's significant comorbidities.  He is agreeable to this plan.    My independent interpretation of chest x-ray is that there is no significant pleural effusion.    Radiology report postoperative changes of CABG.    Discussed with cardiology.  Will admit for further management.  Cardiac enzyme currently negative.  EKG not showing ST elevation.        Admission disposition: 11/20/2024 10:23 AM           Medical Decision Making      Disposition and Plan     Clinical Impression:  1. Acute chest pain         Disposition:  Admit  11/20/2024 10:23  am    Follow-up:  No follow-up provider specified.        Medications Prescribed:  Current Discharge Medication List              Supplementary Documentation:         Hospital Problems       Present on Admission  Date Reviewed: 5/26/2023            ICD-10-CM Noted POA    * (Principal) Acute chest pain R07.9 11/20/2024 Unknown

## 2024-11-20 NOTE — CONSULTS
[facility]  Report of Consultation    Michael Crespo Patient Status:  Observation    1945 MRN [id number]   Formerly [facility] 8NE-A Attending Sergio Jimenez, DO   Hosp Day # 0 PCP Jd Lovell MD     Reason for Consultation:   Chest pain    History of Present Illness:   Michael Crespo is a(n) 79 year old male with a past history of HTN, HL, hypothyroidism, and CAD.  Pt underwent PCI in  and emergent CABG in  [place], [place] with LIMA to LAD, SVG to D1, SVG to D2.  Pt exhibited brief PAF post-op from CABG only. Cath performed  EF 35-40%, 100% mid LAD instent restenosis, normal RCA, LIMA not tied to coronaries, patent SVG to D1 and D2 (seq) with retro filling of LAD to the occlusion.  Per prior notes\"Stress test will always be abn in anterior region for him d/t occluded LAD that wasn't bypassed\".  Cath performed in  with 100% LAD, mild CAD of LCx and dominant RCA, with patent LIMA and SVG grafts per report.  Lexiscan PET / CT with anterior infarct and minimal maranda-infarct ischemia.  AT that time pt was relatively asymptomatic and managed medically.  Seen 24 with concerns over chest discomfort.  Plan was made for cath on 24.  Last night patient noted onset of chest discomfort \"like an anvil sitting on my chest\"  Rated 6/10 in severity.  No associated SOB, N,D.  Radiated to bilateral upper shoulders.  Remniscent of Sx which were present prior to CABG.  Proceeded to pharmacy to obtain SL NTG.  Took 2 SL NTG spaced 5 minutes apart with relief.  Retired to sleep and awoke feeling well, however soon thereafter noted recurrence of Sx.  Took one SL NTG without relief.  Called EMS and brought to the ER.  Given morphine sulfate with relief.  Feels well at this point.  Despite hours of Sx, CV isoenzymes are negative.          Past Medical History:   Past Medical History:    Anxiety    Arrhythmia    afib    Atherosclerosis of coronary artery    BPH (benign  prostatic hyperplasia)    CAD (coronary artery disease)    Colitis    Congestive heart disease (HCC)    Coronary atherosclerosis    Deep vein thrombosis (HCC)    H/O heart bypass surgery    Hearing impairment    L > R    Heart attack (HCC)    Heart murmur    High blood pressure    History of blood clots    History of cardiac arrest    History of DVT of lower extremity    April 2014    Hypertension    Hyperthyroidism    ICH (intracerebral hemorrhage) (HCC)    Migraines    Muscle weakness    uses cane    Other and unspecified hyperlipidemia    Pneumonia, organism unspecified(486)    Psychiatric disorder    Pulmonary embolism (HCC)    2008, 2010, 3/2014    Recurrent major depressive disorder, in full remission (HCC)    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    Unspecified essential hypertension    Unspecified sleep apnea    AHI 38 RDI 38 SaO2 reza 85 % CPAP 6 [employer]    Visual impairment       Social History:   Smoking:  Former  Alcohol:  None    Family History:   Positive family history of premature arthrosclerotic heart disease     Medications:   Scheduled:    [START ON 11/21/2024] aspirin  81 mg Oral Daily    [START ON 11/21/2024] atorvastatin  40 mg Oral Nightly    [START ON 11/21/2024] famotidine  40 mg Oral Daily    [START ON 11/21/2024] finasteride  5 mg Oral Daily    [START ON 11/21/2024] hydroCHLOROthiazide  25 mg Oral Daily    levETIRAcetam  500 mg Oral BID    [START ON 11/21/2024] levothyroxine  100 mcg Oral Daily @ 0700    [START ON 11/21/2024] sertraline  200 mg Oral Daily    initial dose (ACS/Afib) heparin  1,000 Units/hr Intravenous Once       Continuous Infusion:    continuous dose heparin         PRN Medications:     acetaminophen    Outpatient Medications:   Medications Ordered Prior to Encounter[1]    Allergies:   Allergies[2]    Review of Systems:   No fevers, chills, change in weight or bowel habits.  Ten point review of systems is otherwise negative or unremarkable.    Physical  Exam:   Vitals:    11/20/24 1507   BP: 121/61   Pulse: 66   Resp: 24   Temp:      Wt Readings from Last 3 Encounters:   11/20/24 243 lb (110.2 kg)   06/26/24 250 lb (113.4 kg)   06/24/23 245 lb (111.1 kg)           General: Well developed, well nourished male.  Pt is in no acute distress.  HEENT:   Normocephalic.  Atraumatic.  Eyes with no scleral icterus.  Neck: Supple.  No JVD.  Carotids 2+ and equal in symmetric fashion.  No bruits are noted.  Cardiac: Regular rate and rhythm.   There is a normal S1 and S2.  No S3 or S4.  No murmurs, rubs, or gallops.  PMI is non-displaced with a normal apical impulse.  Lungs: Clear to ascultation bilaterally.  No focal rales, rhonchi, or wheezes.  Good air movement is noted throughout all lung fields.  Abdomen: Soft.  Non-distended.  Non-tender.  Bowel sounds are present and normoactive.  No guarding or rebound.   Extremities: Extremities do not demonstrate any evidence of peripheral edema.   No cyanosis or clubbing of the digits is appreciated.  Femoral, Dorsalis Pedis, and Posterior Tibialis  pulses are 2+ and equal in a symmetric fashion.  Neurologic: Alert and oriented, normal affect.  No gross deficit appreciated.  Integument:  No visible rashes are appreciated.      Laboratories and Data:   Labs:    Recent Labs   Lab 11/20/24  0932   *   BUN 12   CREATSERUM 0.82   CA 9.6   ALB 4.4      K 3.4*      CO2 31.0   ALKPHO 84   AST 11   ALT <7*   BILT 1.3*   TP 7.0       Recent Labs   Lab 11/20/24  0932   RBC 4.64   HGB 13.1   HCT 39.2   MCV 84.5   MCH 28.2   MCHC 33.4   RDW 13.8   NEPRELIM 4.57   WBC 6.0   .0       Recent Labs   Lab 11/20/24  0932   PTP 18.8*   INR 1.56*       No results for input(s): \"TROP\", \"CK\" in the last 168 hours.    Diagnostics:   EKG: SB.  1 AVB.  RBBB    Assessment:  1. HTN  2. HL  3. CAD  -PCI 2006  4. S/P CABG emergent 2006  -2006 [place], [place]. LIMA to LAD, SVG to D1, SVG to D2  -Cath 2014 EF 35-40%, 100%  mid LAD instent restenosis, normal RCA, LIMA not tied to coronaries, patent SVG to D1 and D2 (seq) with retro filling of LAD to the occlusion  -Per prior notes\"Stress test will always be abn in anterior region for him d/t occluded LAD that wasn't bypassed\".    -Cath 2019 with 100% LAD, mild CAD of LCx and dominant RCA, with patent LIMA and SVG grafts per report.  5. Ischemic Cardiomyopathy  -EF 40-45% in 1/18 and 5/21  -EF 50-55% 9/22  -EF 50-55% with Ant / Ant sept WMA 11/23  6. H/O DVT / PE events (2007,2009,2011, 2104  -Chronic anticoagulation  7. H/O PAF  -?Only post-op  -Exam c/w SR today  8. JOSÉ MANUEL  9. H/O CVA  10. Hypothyroidism  11. Chest discomfort - some concern form angina and reminiscent of past angina, however negative CV isoenzynes despite hours of Sx.    Plan:  ASA  Statin  Topical NTG  Hold Xarelto for cath  Cardiac cath.  The nature of cardiac catheterization has been reviewed in detail.  Risks including, but not limited to the major risks of CVA, MI, and death have been reviewed.  The potential for percutaneous coronary intervention has been reviewed.  Risks of emergent CABG and restenosis have been reviewed.  The patient acknowledges risks of the procedure and agrees to proceed.  Serial CV isoenzymes      Isaías Teran MD  11/20/2024  3:25 PM         [1]   No current facility-administered medications on file prior to encounter.     Current Outpatient Medications on File Prior to Encounter   Medication Sig Dispense Refill    nitroglycerin 0.4 MG Sublingual SL Tab Place 1 tablet (0.4 mg total) under the tongue every 5 (five) minutes as needed for Chest pain.      hydroCHLOROthiazide 25 MG Oral Tab Take 1 tablet (25 mg total) by mouth daily.      VITAMIN C OR Take 1 tablet by mouth daily.      KETOCONAZOLE 1 % External Shampoo Apply 1 Application topically as needed.      tobramycin 0.3 % Ophthalmic Solution Place 1 drop into the right eye every 4 (four) hours. For 5 days (Patient taking differently:  Place 1 drop into the right eye as needed. Pt reports to use as needed)      rivaroxaban (XARELTO) 10 MG Oral Tab Take 1 tablet (10 mg total) by mouth. 5/26/23: per Pt to stop 48 hours before procedure- per Dr. Guzman      aspirin 81 MG Oral Chew Tab Chew 1 tablet (81 mg total) by mouth every other day.      levETIRAcetam 500 MG Oral Tab Take 1 tablet (500 mg total) by mouth 2 (two) times daily.      atorvastatin 40 MG Oral Tab Take 1 tablet (40 mg total) by mouth daily.      SERTRALINE 100 MG Oral Tab TAKE TWO TABLETS BY MOUTH ONCE DAILY 180 tablet 0    ALPRAZolam 0.25 MG Oral Tab Take 1 tablet (0.25 mg total) by mouth 3 (three) times daily as needed. 30 tablet 0    FINASTERIDE 5 MG Oral Tab Take 1 tablet (5 mg total) by mouth daily. 90 tablet 0    potassium chloride 20 MEQ Oral Tab CR Take 1 tablet (20 mEq total) by mouth 2 (two) times daily. 180 tablet 1    LEVOTHYROXINE 100 MCG Oral Tab TAKE ONE TABLET BY MOUTH ONCE DAILY 90 tablet 0    Betamethasone Dipropionate 0.05 % External Lotion Apply to AA of scalp BID x 2-3 weeks, then hold 1 week. Repeat prn 60 mL 2    famotidine 40 MG Oral Tab Take 1 tablet (40 mg total) by mouth daily. 90 tablet 3   [2]   Allergies  Allergen Reactions    Demerol [Meperidine Hcl] OTHER (SEE COMMENTS)     \"Loss of psychomotor skills and vomiting\"

## 2024-11-20 NOTE — ED QUICK NOTES
Rounding Completed    Plan of Care reviewed. Waiting for  inpatient bed ready  Elimination needs assessed.      Bed is locked and in lowest position. Call light within reach.

## 2024-11-21 ENCOUNTER — APPOINTMENT (OUTPATIENT)
Dept: INTERVENTIONAL RADIOLOGY/VASCULAR | Facility: HOSPITAL | Age: 79
End: 2024-11-21
Attending: INTERNAL MEDICINE
Payer: MEDICARE

## 2024-11-21 LAB
ALBUMIN SERPL-MCNC: 3.9 G/DL (ref 3.2–4.8)
ALP LIVER SERPL-CCNC: 74 U/L
ALT SERPL-CCNC: <7 U/L
ANION GAP SERPL CALC-SCNC: 5 MMOL/L (ref 0–18)
AST SERPL-CCNC: 9 U/L (ref ?–34)
ATRIAL RATE: 55 BPM
BILIRUB DIRECT SERPL-MCNC: 0.4 MG/DL (ref ?–0.3)
BILIRUB SERPL-MCNC: 0.9 MG/DL (ref 0.2–1.1)
BUN BLD-MCNC: 19 MG/DL (ref 9–23)
CALCIUM BLD-MCNC: 9 MG/DL (ref 8.7–10.4)
CHLORIDE SERPL-SCNC: 104 MMOL/L (ref 98–112)
CK SERPL-CCNC: 31 U/L
CO2 SERPL-SCNC: 30 MMOL/L (ref 21–32)
CREAT BLD-MCNC: 0.84 MG/DL
EGFRCR SERPLBLD CKD-EPI 2021: 90 ML/MIN/1.73M2 (ref 60–?)
ERYTHROCYTE [DISTWIDTH] IN BLOOD BY AUTOMATED COUNT: 13.8 %
GLUCOSE BLD-MCNC: 109 MG/DL (ref 70–99)
HCT VFR BLD AUTO: 37.6 %
HGB BLD-MCNC: 12.1 G/DL
MAGNESIUM SERPL-MCNC: 1.8 MG/DL (ref 1.6–2.6)
MCH RBC QN AUTO: 27.7 PG (ref 26–34)
MCHC RBC AUTO-ENTMCNC: 32.2 G/DL (ref 31–37)
MCV RBC AUTO: 86 FL
OSMOLALITY SERPL CALC.SUM OF ELEC: 291 MOSM/KG (ref 275–295)
P AXIS: 0 DEGREES
P-R INTERVAL: 236 MS
PLATELET # BLD AUTO: 146 10(3)UL (ref 150–450)
PLATELETS.RETICULATED NFR BLD AUTO: 3.2 % (ref 0–7)
POTASSIUM SERPL-SCNC: 3.4 MMOL/L (ref 3.5–5.1)
POTASSIUM SERPL-SCNC: 3.4 MMOL/L (ref 3.5–5.1)
PROT SERPL-MCNC: 6.4 G/DL (ref 5.7–8.2)
Q-T INTERVAL: 506 MS
QRS DURATION: 160 MS
QTC CALCULATION (BEZET): 484 MS
R AXIS: 65 DEGREES
RBC # BLD AUTO: 4.37 X10(6)UL
SODIUM SERPL-SCNC: 139 MMOL/L (ref 136–145)
T AXIS: 76 DEGREES
TROPONIN I SERPL HS-MCNC: 22 NG/L
TROPONIN I SERPL HS-MCNC: 23 NG/L
VENTRICULAR RATE: 55 BPM
WBC # BLD AUTO: 5.1 X10(3) UL (ref 4–11)

## 2024-11-21 PROCEDURE — 80076 HEPATIC FUNCTION PANEL: CPT | Performed by: HOSPITALIST

## 2024-11-21 PROCEDURE — 82550 ASSAY OF CK (CPK): CPT | Performed by: INTERNAL MEDICINE

## 2024-11-21 PROCEDURE — 93010 ELECTROCARDIOGRAM REPORT: CPT | Performed by: INTERNAL MEDICINE

## 2024-11-21 PROCEDURE — 99153 MOD SED SAME PHYS/QHP EA: CPT | Performed by: INTERNAL MEDICINE

## 2024-11-21 PROCEDURE — 4A023N7 MEASUREMENT OF CARDIAC SAMPLING AND PRESSURE, LEFT HEART, PERCUTANEOUS APPROACH: ICD-10-PCS | Performed by: INTERNAL MEDICINE

## 2024-11-21 PROCEDURE — 99152 MOD SED SAME PHYS/QHP 5/>YRS: CPT | Performed by: INTERNAL MEDICINE

## 2024-11-21 PROCEDURE — 83735 ASSAY OF MAGNESIUM: CPT | Performed by: HOSPITALIST

## 2024-11-21 PROCEDURE — B2131ZZ FLUOROSCOPY OF MULTIPLE CORONARY ARTERY BYPASS GRAFTS USING LOW OSMOLAR CONTRAST: ICD-10-PCS | Performed by: INTERNAL MEDICINE

## 2024-11-21 PROCEDURE — B2181ZZ FLUOROSCOPY OF LEFT INTERNAL MAMMARY BYPASS GRAFT USING LOW OSMOLAR CONTRAST: ICD-10-PCS | Performed by: INTERNAL MEDICINE

## 2024-11-21 PROCEDURE — 93459 L HRT ART/GRFT ANGIO: CPT | Performed by: INTERNAL MEDICINE

## 2024-11-21 PROCEDURE — 96372 THER/PROPH/DIAG INJ SC/IM: CPT

## 2024-11-21 PROCEDURE — 85027 COMPLETE CBC AUTOMATED: CPT | Performed by: HOSPITALIST

## 2024-11-21 PROCEDURE — B2151ZZ FLUOROSCOPY OF LEFT HEART USING LOW OSMOLAR CONTRAST: ICD-10-PCS | Performed by: INTERNAL MEDICINE

## 2024-11-21 PROCEDURE — 80048 BASIC METABOLIC PNL TOTAL CA: CPT | Performed by: HOSPITALIST

## 2024-11-21 PROCEDURE — 84484 ASSAY OF TROPONIN QUANT: CPT | Performed by: INTERNAL MEDICINE

## 2024-11-21 PROCEDURE — 84132 ASSAY OF SERUM POTASSIUM: CPT | Performed by: HOSPITALIST

## 2024-11-21 PROCEDURE — B2111ZZ FLUOROSCOPY OF MULTIPLE CORONARY ARTERIES USING LOW OSMOLAR CONTRAST: ICD-10-PCS | Performed by: INTERNAL MEDICINE

## 2024-11-21 PROCEDURE — 93005 ELECTROCARDIOGRAM TRACING: CPT

## 2024-11-21 RX ORDER — MORPHINE SULFATE 2 MG/ML
INJECTION, SOLUTION INTRAMUSCULAR; INTRAVENOUS
Status: COMPLETED
Start: 2024-11-21 | End: 2024-11-21

## 2024-11-21 RX ORDER — MAGNESIUM OXIDE 400 MG/1
400 TABLET ORAL ONCE
Status: COMPLETED | OUTPATIENT
Start: 2024-11-21 | End: 2024-11-21

## 2024-11-21 RX ORDER — CYCLOBENZAPRINE HCL 5 MG
5 TABLET ORAL ONCE AS NEEDED
Status: COMPLETED | OUTPATIENT
Start: 2024-11-21 | End: 2024-11-21

## 2024-11-21 RX ORDER — IOPAMIDOL 755 MG/ML
200 INJECTION, SOLUTION INTRAVASCULAR
Status: COMPLETED | OUTPATIENT
Start: 2024-11-21 | End: 2024-11-21

## 2024-11-21 RX ORDER — MORPHINE SULFATE 2 MG/ML
1 INJECTION, SOLUTION INTRAMUSCULAR; INTRAVENOUS ONCE
Status: COMPLETED | OUTPATIENT
Start: 2024-11-21 | End: 2024-11-21

## 2024-11-21 RX ORDER — LIDOCAINE HYDROCHLORIDE 10 MG/ML
INJECTION, SOLUTION EPIDURAL; INFILTRATION; INTRACAUDAL; PERINEURAL
Status: COMPLETED
Start: 2024-11-21 | End: 2024-11-21

## 2024-11-21 RX ORDER — MIDAZOLAM HYDROCHLORIDE 1 MG/ML
INJECTION INTRAMUSCULAR; INTRAVENOUS
Status: COMPLETED
Start: 2024-11-21 | End: 2024-11-21

## 2024-11-21 RX ORDER — HYDROCODONE BITARTRATE AND ACETAMINOPHEN 5; 325 MG/1; MG/1
1 TABLET ORAL ONCE AS NEEDED
Status: COMPLETED | OUTPATIENT
Start: 2024-11-21 | End: 2024-11-21

## 2024-11-21 RX ORDER — HEPARIN SODIUM 5000 [USP'U]/ML
INJECTION, SOLUTION INTRAVENOUS; SUBCUTANEOUS
Status: COMPLETED
Start: 2024-11-21 | End: 2024-11-21

## 2024-11-21 NOTE — PLAN OF CARE
Assumed care of patient at 1930. Aox4. RA, 2L O2 NOC for JOSÉ MANUEL management. Pt reports mild chest pressure, declines pain medication at this time, morphine IV given by previous shift. Continent of bowel and bladder. SBA. Bed locked and in lowest position. Call light and personal items within reach.      Angio planned 11/21. Groin prep completed. Patient requesting to sign consent morning of.  -Declining to sign till MD Teran discuss section 9 of the procedure consent form and its relation to a term on the POA/POLST form.      11/21 0400~ Pt diaphoretic, chest pain/ Radiating pain from R-neck to R-shoulder, multifocal PVCs. Scheduled nitroglycerin paste applied. MD Teran notified, orders for: Trop 0600, 1mg morphine IV, EKG (SB, 1st deg AVB, BBB, PVCs).     -Pt requesting to review POLST information with Hospitalist in the morning, MD signature required (11/21)  -POA form in pt binder      Problem: CARDIOVASCULAR - ADULT  Goal: Maintains optimal cardiac output and hemodynamic stability  Description: INTERVENTIONS:  - Monitor vital signs, rhythm, and trends  - Monitor for bleeding, hypotension and signs of decreased cardiac output  - Evaluate effectiveness of vasoactive medications to optimize hemodynamic stability  - Monitor arterial and/or venous puncture sites for bleeding and/or hematoma  - Assess quality of pulses, skin color and temperature  - Assess for signs of decreased coronary artery perfusion - ex. Angina  - Evaluate fluid balance, assess for edema, trend weights  11/20/2024 2314 by Mayito Kumari RN  Outcome: Progressing  11/20/2024 2314 by Mayito Kumari RN  Outcome: Progressing  11/20/2024 2314 by Mayito Kumari RN  Outcome: Progressing  Goal: Absence of cardiac arrhythmias or at baseline  Description: INTERVENTIONS:  - Continuous cardiac monitoring, monitor vital signs, obtain 12 lead EKG if indicated  - Evaluate effectiveness of antiarrhythmic and heart rate control medications as ordered  - Initiate emergency  measures for life threatening arrhythmias  - Monitor electrolytes and administer replacement therapy as ordered  11/20/2024 2314 by Mayito Kumari RN  Outcome: Progressing  11/20/2024 2314 by Mayito Kumari RN  Outcome: Progressing  11/20/2024 2314 by Mayito Kumari RN  Outcome: Progressing     Problem: PAIN - ADULT  Goal: Verbalizes/displays adequate comfort level or patient's stated pain goal  Description: INTERVENTIONS:  - Encourage pt to monitor pain and request assistance  - Assess pain using appropriate pain scale  - Administer analgesics based on type and severity of pain and evaluate response  - Implement non-pharmacological measures as appropriate and evaluate response  - Consider cultural and social influences on pain and pain management  - Manage/alleviate anxiety  - Utilize distraction and/or relaxation techniques  - Monitor for opioid side effects  - Notify MD/LIP if interventions unsuccessful or patient reports new pain  - Anticipate increased pain with activity and pre-medicate as appropriate  11/20/2024 2314 by Mayito Kumari RN  Outcome: Progressing  11/20/2024 2314 by Mayito Kumari RN  Outcome: Progressing  11/20/2024 2314 by Mayito Kumari RN  Outcome: Progressing

## 2024-11-21 NOTE — PROGRESS NOTES
[facility]   part of [facility]     Hospitalist Progress Note     Michael Crespo Patient Status:  Observation    1945 MRN [id number]   [facility] INTERVENTIONAL SUITES Attending Neal James MD   Hosp Day # 0 PCP Jd Lovell MD     Chief Complaint: Chest pain,    Subjective:   Patient is doing well, currently chest pain-free going for an angiogram this afternoon  Objective:    Review of Systems:   A comprehensive review of systems was completed; pertinent positive and negatives stated in subjective.    Vital signs:  Temp:  [97.7 °F (36.5 °C)-98.5 °F (36.9 °C)] 98.5 °F (36.9 °C)  Pulse:  [51-78] 78  Resp:  [11-25] 25  BP: (113-136)/(66-73) 130/72  SpO2:  [92 %-97 %] 97 %    Physical Exam:      General:  Alert, no distress, appears stated age.   Head:  Normocephalic,    Eyes:  Sclera anicteric    Throat: MMM   Neck: Supple,    Lungs:   Clear to auscultation bilaterally. Normal effort   Chest wall:  No tenderness or deformity.   Heart:  Regular rate and rhythm    Abdomen:   Soft, NT/ND, +bs   Extremities: Atraumatic,  chronic mild edema   Skin: No visible rashes or lesions.    Neurologic: No facial asymmetry, no dysarthria, moving extremities        Diagnostic Data:    Labs:  Recent Labs   Lab 24  0932 24  0520   WBC 6.0 5.1   HGB 13.1 12.1*   MCV 84.5 86.0   .0 146.0*   INR 1.56*  --        Recent Labs   Lab 24  0932 24  0520   * 109*   BUN 12 19   CREATSERUM 0.82 0.84   CA 9.6 9.0   ALB 4.4 3.9    139   K 3.4* 3.4*  3.4*    104   CO2 31.0 30.0   ALKPHO 84 74   AST 11 9   ALT <7* <7*   BILT 1.3* 0.9   TP 7.0 6.4       Estimated Creatinine Clearance: 85.6 mL/min (based on SCr of 0.84 mg/dL).    Recent Labs   Lab 24  1606 1124  0038 24  0520   TROPHS 22  22 18 23 22   CK 30* 31* 31*  --        Recent Labs   Lab 24  0932   PTP 18.8*   INR 1.56*                  Microbiology    No results  found for this visit on 11/20/24.      Imaging: Reviewed in Epic.    Medications:    aspirin  81 mg Oral Daily    atorvastatin  40 mg Oral Nightly    famotidine  40 mg Oral Daily    finasteride  5 mg Oral Daily    hydroCHLOROthiazide  25 mg Oral Daily    levETIRAcetam  500 mg Oral BID    levothyroxine  100 mcg Oral Daily @ 0700    sertraline  200 mg Oral Daily    heparin  5,000 Units Subcutaneous 2 times per day    nitroGLYCERIN  1 inch Topical q6h       Assessment & Plan:        Patient is a 79 year old male with PMH sig for CAD with prior stent/ emergent CABG, HTN, HLD, ischemic CM, PAF, DVT / PE hx on AC, here for chest pain     Impression     -intermittent chest pain  -CAD with prior PCI / CABG  -HTN  -HLD  -ischemic CM   -pAF     -DVT / PE hx on chronic AC  -JOSÉ MANUEL  -obesity  -hypothyroidism     -seizure do     Plan     *CV  -serial trop, has been negative  -tele  Plan for angiogram today  -Cardiology consulted  -aspirin  -statin  -Awaiting results of the angiogram     *Pulm  -on chronic AC for prior VTE  -switch to heparin drip while off AC  -cpap      *chronic conditions - home meds as able       Gale Sommers MD    Supplementary Documentation:     Quality:  DVT Mechanical Prophylaxis:   SCDs,    DVT Pharmacologic Prophylaxis   Medication    heparin (Porcine) 5000 UNIT/ML injection 5,000 Units      DVT Pharmacologic prophylaxis: Aspirin 162 mg         Code Status: DNAR/Selective Treatment  Huggins: No urinary catheter in place  Huggins Duration (in days):   Central line:    JARRED: 11/22/2024    The 21st Century Cures Act makes medical notes like these available to patients in the interest of transparency. Please be advised this is a medical document. Medical documents are intended to carry relevant information, facts as evident, and the clinical opinion of the practitioner. The medical note is intended as peer to peer communication and may appear blunt or direct. It is written in medical language and may contain  abbreviations or verbiage that are unfamiliar.              **Certification      PHYSICIAN Certification of Need for Inpatient Hospitalization - Initial Certification    Patient will require inpatient services that will reasonably be expected to span two midnight's based on the clinical documentation in H+P.   Based on patients current state of illness, I anticipate that, after discharge, patient will require TBD.

## 2024-11-21 NOTE — PLAN OF CARE
Received patient at approximately 0700. Alert and oriented x4. Breathing unlabored on RA. Vitals stable; Sinus tomas on telemetry. All due medications given and tolerated well. No c/o pain or discomfort. Cont b/b. Patient currently lying in bed c call light and personal items in reach. All needs met at this time.     Problem: CARDIOVASCULAR - ADULT  Goal: Maintains optimal cardiac output and hemodynamic stability  Description: INTERVENTIONS:  - Monitor vital signs, rhythm, and trends  - Monitor for bleeding, hypotension and signs of decreased cardiac output  - Evaluate effectiveness of vasoactive medications to optimize hemodynamic stability  - Monitor arterial and/or venous puncture sites for bleeding and/or hematoma  - Assess quality of pulses, skin color and temperature  - Assess for signs of decreased coronary artery perfusion - ex. Angina  - Evaluate fluid balance, assess for edema, trend weights  Outcome: Progressing  Goal: Absence of cardiac arrhythmias or at baseline  Description: INTERVENTIONS:  - Continuous cardiac monitoring, monitor vital signs, obtain 12 lead EKG if indicated  - Evaluate effectiveness of antiarrhythmic and heart rate control medications as ordered  - Initiate emergency measures for life threatening arrhythmias  - Monitor electrolytes and administer replacement therapy as ordered  Outcome: Progressing     Problem: PAIN - ADULT  Goal: Verbalizes/displays adequate comfort level or patient's stated pain goal  Description: INTERVENTIONS:  - Encourage pt to monitor pain and request assistance  - Assess pain using appropriate pain scale  - Administer analgesics based on type and severity of pain and evaluate response  - Implement non-pharmacological measures as appropriate and evaluate response  - Consider cultural and social influences on pain and pain management  - Manage/alleviate anxiety  - Utilize distraction and/or relaxation techniques  - Monitor for opioid side effects  - Notify MD/LIP  if interventions unsuccessful or patient reports new pain  - Anticipate increased pain with activity and pre-medicate as appropriate  Outcome: Progressing

## 2024-11-21 NOTE — PLAN OF CARE
Received patient at approximately 1400. Breathing unlabored on room air. Vitals stable. NSR on telemetry. Patient c/o 5/10 chest \"pressure\"; PRN Morphine given per MD order. - effective. Admission assessment complete. Body assessment preformed x2 RNs. Patient lying in bed c call light and personal items in reach. All needs met at this time.

## 2024-11-21 NOTE — PROCEDURES
OPERATIVE REPORT - DIAGNOSTIC CARDIAC CATHETERIZATION    Date of Procedure: 11/21/2024     Performing Physician: Candelario Kelly MD    Referring Physician: Isaías Teran MD    Indication: This is a 79 year old male who presents with chest pain. H/o 3V CABG 2006.  Referred for left heart catheterization with coronary angiography to evaluate for obstructive CAD.     Pre-Procedure Diagnosis:   Chest pain  H/o 3v CABG   H/o abnormal stress testing    Post-Procedure Diagnosis:  Same as pre    Procedures performed:   1. Left heart catheterization  2. Selective bilateral coronary and CABG angiography  3. Moderate sedation  4. Ultrasound guided access of right common femoral artery  5. Perclose Proglide vascular closure of R CFA    Findings:  1. Left main: No stenosis   2. LAD: Prox 30%, mid bifurcation stents are chronically occluded. No flow to the apical LAD  3. LCX: Supplies a medium sized branching OM, luminal irregularities    4. RCA: Dominant to the PDA and large PL system, luminal irregularities    5. SVG to D1 and jump to D2: Widely patent graft. D1 luminal irregularities. D2 luminal irregularities distally, extending to lateral apex. Backfills proximally through the diagonal stent and into the LAD stent, but does not travel downstream towards the apex.    6. LIMA has 2 major branches, one that likely attaches to the distal LAD and another branch in situ. The distal LAD is diminutive.    7. LV: EF 50%, no RWMA, no sig MR. LVEDP 12 mmHg. No LV-Ao gradient on pullback.   6. Right CFA: No stenosis or plaque. S/p Perclose Proglide.    Conclusions / Recommendations:  Chronic occlusion of mid LAD stent. Patent LIMA to distal LAD, and patent SVG jump graft from D1 to D2.  Preserved LVEF. Likely noncardiac etiology of chest pain. Continue medical therapy.    Description of Procedure: Informed consent was obtained from the patient and any accompanying family. The risks and benefits of the procedure were reviewed in detail  including but not limited to the risk of myocardial infarction, stroke, renal failure, bleeding, and death. After a thorough discussion of these risks and benefits, the patient agreed to proceed and signed an informed consent document. The patient was brought to the cardiac catheterization laboratory in the fasting state. Moderate sedation was employed using a total of IV Versed 4 mg and IV Fentanyl 100 mcg in divided doses.  I directly observed the patient for > 30 min, and an independent trained observer was present and assisted in the monitoring of the patient's level of consciousness and physiological status, heart rate, blood pressure, oximetry, and rhythm. All operators present for the case performed standard pre-procedural prep including hand washing, sterile gloves, gown, mask, and cap. All aspects of the maximum sterile barrier technique were followed. A preprocedural time out was performed with all physicians, technologists, and nurses involved with the procedure. 2% lidocaine was infiltrated subcutaneously in the right groin for local anesthesia. Ultrasound guided access was obtained in the right common femoral artery with a 6F [place] sheath using the modified Seldinger technique and a micropuncture needle. Pulsatile bright red blood return was seen. The sheath was flushed gently and with a wire left in, angiogram of the ileofemoral arteries was taken, showing the arteriotomy site to be above the bifurcation of the SFA and below the inferior epigastric artery. Selective bilateral coronary angiography was performed using 6F JL4 and JR4 diagnostic catheters, advanced over a J tipped wire through the arterial sheath and engaged in the left and right coronary arteries respectively. The JR4 was used to engage the SVG and then NANO used to engage the L subclavian and LIMA over a wire. Standard angiographic views were taken in orthogonal projections. All catheter exchanges were performed over a wire. A pigtail  catheter was prolapsed across the aortic valve and LV pressures were taken. A ventriculogram was performed. The pigtail catheter was pulled back across the aortic valve to assess for aortic stenosis. The catheter was then removed over a wire. At the conclusion of the procedure, all catheters and wires were removed over a wire. The arterial sheath was removed and hemostasis achieved with a Perclose Proglide closure device, deployed in the usual fashion. There was no bleeding or hematoma. The distal pulses were intact following this.  The patient tolerated the procedure well without complication.    EBL <10 ml  Total contrast 150 ml  See procedure log for total fluoro time and radiation dose.     Candelario Kelly MD  Interventional Cardiology  [facility]  Office: [phone]

## 2024-11-21 NOTE — PROGRESS NOTES
[facility]  Cardiology Progress Note    Michael Crespo Patient Status:  Observation    1945 MRN [id number]   Location [facility] 8NE-A Attending Neal James MD   Hosp Day # 0 PCP Jd Lovell MD       Subjective: Overnight with chest pain. Troponin remains negative. S/p cath showing patent grafts.     Allergies:   Allergies[1]    Medications:  Current Facility-Administered Medications   Medication Dose Route Frequency    aspirin chewable tab 81 mg  81 mg Oral Daily    atorvastatin (Lipitor) tab 40 mg  40 mg Oral Nightly    famotidine (Pepcid) tab 40 mg  40 mg Oral Daily    finasteride (Proscar) tab 5 mg  5 mg Oral Daily    hydroCHLOROthiazide tab 25 mg  25 mg Oral Daily    levETIRAcetam (Keppra) tab 500 mg  500 mg Oral BID    levothyroxine (Synthroid) tab 100 mcg  100 mcg Oral Daily @ 0700    sertraline (Zoloft) tab 200 mg  200 mg Oral Daily    acetaminophen (Tylenol Extra Strength) tab 500 mg  500 mg Oral Q4H PRN    heparin (Porcine) 5000 UNIT/ML injection 5,000 Units  5,000 Units Subcutaneous 2 times per day    nitroGLYCERIN (Nitrobid) 2 % ointment 1 inch  1 inch Topical q6h    ALPRAZolam (Xanax) tab 0.25 mg  0.25 mg Oral TID PRN       Problems:  Patient Active Problem List   Diagnosis    Allergic rhinitis due to other allergen    Thyroid nodule    Anxiety    Hypothyroid    Hypertension    H/O heart bypass surgery    Hyperlipidemia    JOSÉ MANUEL (obstructive sleep apnea)    Monitoring for long-term anticoagulant use    Hyperaldosteronism (HCC)    Coronary artery disease involving native coronary artery of native heart without angina pectoris    BPH with obstruction/lower urinary tract symptoms    FH: colon cancer    Anticoagulated by anticoagulation treatment    Intracranial hemorrhage (HCC)    Sinus bradycardia    Dilated aortic root (HCC)    Chronic systolic CHF (congestive heart failure) (HCC)    Recurrent major depressive disorder, in full remission (HCC)    Chronic atrial  fibrillation, unspecified (HCC)    History of subarachnoid hemorrhage    Hx of syncope    Hx of fall    Balance problem    History of pulmonary embolism    Cardiomyopathy, ischemic    Acute DVT of left tibial vein (HCC)    Hyperglycemia    Anticoagulated on warfarin    Gross hematuria    Acute chest pain       Objective:   Temp: 98.5 °F (36.9 °C)  Pulse: 78  Resp: 25  BP: 130/72    Intake/Output:     Intake/Output Summary (Last 24 hours) at 11/21/2024 1634  Last data filed at 11/21/2024 1000  Gross per 24 hour   Intake 240 ml   Output --   Net 240 ml       Last 3 Weights   11/20/24 1502 243 lb (110.2 kg)   11/20/24 0924 250 lb (113.4 kg)   06/26/24 0855 250 lb (113.4 kg)   06/24/23 1419 245 lb (111.1 kg)       Physical Exam:    General: Alert and oriented x 3. No apparent distress. No respiratory or constitutional distress.  HEENT: Normocephalic, anicteric sclera  Neck: No JVD  Cardiac: Regular rate and rhythm. S1, S2 normal. No murmur, rubs or gallops.   Lungs: Clear without wheezes, rales, rhonchi or dullness.  Normal excursions and effort.  Abdomen: Soft, non-tender. BS-present.  Extremities: Without clubbing, cyanosis or edema.  Peripheral pulses are intact and equal.  Neurologic: Alert and oriented, normal affect. No motor or coordinational deficit.  Skin: Warm and dry.     Laboratory/Data:    Lab Results   Component Value Date    CK 31 11/21/2024       Recent Labs   Lab 11/20/24  0932 11/21/24  0520   WBC 6.0 5.1   HGB 13.1 12.1*   MCV 84.5 86.0   .0 146.0*   INR 1.56*  --        Recent Labs   Lab 11/20/24  0932 11/21/24  0520    139   K 3.4* 3.4*  3.4*    104   CO2 31.0 30.0   BUN 12 19   CREATSERUM 0.82 0.84   CA 9.6 9.0   MG  --  1.8   * 109*       Recent Labs   Lab 11/20/24  0932 11/21/24  0520   ALT <7* <7*   AST 11 9   ALB 4.4 3.9       No results for input(s): \"TROP\" in the last 168 hours.      Diagnostics:    Tele: SR      Impression:  1. HTN  2. HL  3. CAD  -PCI 2006  4. S/P  CABG emergent 2006  -2006 [place], [place]. LIMA to LAD, SVG to D1, SVG to D2  -Cath 2014 EF 35-40%, 100% mid LAD instent restenosis, normal RCA, LIMA not tied to coronaries, patent SVG to D1 and D2 (seq) with retro filling of LAD to the occlusion  -Per prior notes\"Stress test will always be abn in anterior region for him d/t occluded LAD that wasn't bypassed\".                 -Cath 2019 with 100% LAD, mild CAD of LCx and dominant RCA, with patent LIMA and SVG grafts per report.  5. Ischemic Cardiomyopathy  -EF 40-45% in 1/18 and 5/21  -EF 50-55% 9/22  -EF 50-55% with Ant / Ant sept WMA 11/23  6. H/O DVT / PE events (2007,2009,2011, 2104  -Chronic anticoagulation  7. H/O PAF  -?Only post-op  -Exam c/w SR today  8. JOSÉ MANUEL  9. H/O CVA  10. Hypothyroidism  11. Chest discomfort - some concern form angina and reminiscent of past angina, however negative CV isoenzynes despite hours of Sx.    Recommendations:  - Cath 11/21/2024 shows patent LIMA-dLAD and SVG jump to D1-D2. There are no targets for revascularization. His symptoms may be small vessel disease or more likely noncardiac etiology.   - Continue  ASA, statin, BP control. Consider trial Imdur since he reports SL NTG did help.   - Monitor overnight, anticipate home tomorrow.     Candelario Kelly MD  Interventional Cardiology  [facility]  Office: [phone]    11/21/2024  4:34 PM         [1]   Allergies  Allergen Reactions    Demerol [Meperidine Hcl] OTHER (SEE COMMENTS)     \"Loss of psychomotor skills and vomiting\"

## 2024-11-22 ENCOUNTER — HOSPITAL ENCOUNTER (OUTPATIENT)
Dept: INTERVENTIONAL RADIOLOGY/VASCULAR | Facility: HOSPITAL | Age: 79
Discharge: HOME | End: 2024-11-22
Attending: INTERNAL MEDICINE
Payer: MEDICARE

## 2024-11-22 VITALS
DIASTOLIC BLOOD PRESSURE: 79 MMHG | TEMPERATURE: 98 F | OXYGEN SATURATION: 98 % | HEIGHT: 74 IN | SYSTOLIC BLOOD PRESSURE: 137 MMHG | RESPIRATION RATE: 16 BRPM | HEART RATE: 59 BPM | WEIGHT: 243 LBS | BODY MASS INDEX: 31.18 KG/M2

## 2024-11-22 LAB
MAGNESIUM SERPL-MCNC: 2 MG/DL (ref 1.6–2.6)
POTASSIUM SERPL-SCNC: 3.7 MMOL/L (ref 3.5–5.1)

## 2024-11-22 PROCEDURE — 84132 ASSAY OF SERUM POTASSIUM: CPT | Performed by: HOSPITALIST

## 2024-11-22 PROCEDURE — 83735 ASSAY OF MAGNESIUM: CPT | Performed by: HOSPITALIST

## 2024-11-22 RX ORDER — POTASSIUM CHLORIDE 1500 MG/1
40 TABLET, EXTENDED RELEASE ORAL ONCE
Status: COMPLETED | OUTPATIENT
Start: 2024-11-22 | End: 2024-11-22

## 2024-11-22 NOTE — PROGRESS NOTES
[facility]  Progress Note    Michael Crespo Patient Status:  Observation    1945 MRN [id number]   Location [facility] 8NE-A Attending Neal James MD   Hosp Day # 0 PCP Jd Lovell MD     Subjective:   No chest pain.  No shortness of breath.  No angina.  Some chronic back issues.  No focal cardiovascular complaints reported.    Objective:  Vitals:    24 1120 24 1900 24 0100 24 0809   BP: 130/72 129/55 117/73 123/73   BP Location: Right arm Right arm Right arm Right arm   Pulse: 78 65 66 61   Resp:    Temp: 98.5 °F (36.9 °C) 97.6 °F (36.4 °C)  97.5 °F (36.4 °C)   TempSrc: Oral Oral  Oral   SpO2: 97%  95% 97%   Weight:       Height:           Temp (24hrs), Av.9 °F (36.6 °C), Min:97.5 °F (36.4 °C), Max:98.5 °F (36.9 °C)      Medications:   Scheduled:    aspirin  81 mg Oral Daily    atorvastatin  40 mg Oral Nightly    famotidine  40 mg Oral Daily    finasteride  5 mg Oral Daily    hydroCHLOROthiazide  25 mg Oral Daily    levETIRAcetam  500 mg Oral BID    levothyroxine  100 mcg Oral Daily @ 0700    sertraline  200 mg Oral Daily    heparin  5,000 Units Subcutaneous 2 times per day    nitroGLYCERIN  1 inch Topical q6h       Continuous Infusion:       PRN Medications:     acetaminophen    ALPRAZolam    Intake/Output:     Intake/Output Summary (Last 24 hours) at 2024 0952  Last data filed at 2024 0809  Gross per 24 hour   Intake 875 ml   Output --   Net 875 ml       Wt Readings from Last 3 Encounters:   24 243 lb (110.2 kg)   24 250 lb (113.4 kg)   23 245 lb (111.1 kg)       Allergies:  Allergies[1]    Physical Exam:   General:  Well-developed / Well-nourished.  No acute distress.  HEENT:  Normocephalic.  Atraumatic.  No icterus.  Neck:  There is no jugular venous distention.   Cardiovascular:  Cardiovascular examination demonstrates a regular rate and rhythm.  There is normal S1, S2.  There is no S3 or S4.  There are no  murmurs, rubs, or gallops.  No click is appreciated.  PMI is nondisplaced with a normal apical impulse.    Pulmonary:  Lungs are clear to auscultation bilaterally.  There are no focal rales, rhonchi, or wheezes.  Good air movement is noted throughout both lung fields.   Abdomen:  The abdomen is soft, non-distended, and non-tender.  Bowel sounds are present and normoactive.  No organomegaly is appreciated.  Extremities:  Extremities do not demonstrate any evidence of peripheral edema.   No cyanosis or clubbing of the digits is appreciated.  Neurologic:  Alert and oriented.  Normal affect.  Integument:  No visible rashes are appreciated.  No hematoma at cath site.      Laboratory/Data:   Recent Labs   Lab 11/20/24  0932 11/21/24  0520 11/22/24  0542   * 109*  --    BUN 12 19  --    CREATSERUM 0.82 0.84  --    CA 9.6 9.0  --    ALB 4.4 3.9  --     139  --    K 3.4* 3.4*  3.4* 3.7    104  --    CO2 31.0 30.0  --    ALKPHO 84 74  --    AST 11 9  --    ALT <7* <7*  --    BILT 1.3* 0.9  --    TP 7.0 6.4  --        Recent Labs   Lab 11/20/24  0932 11/21/24  0520   RBC 4.64 4.37   HGB 13.1 12.1*   HCT 39.2 37.6*   MCV 84.5 86.0   MCH 28.2 27.7   MCHC 33.4 32.2   RDW 13.8 13.8   NEPRELIM 4.57  --    WBC 6.0 5.1   .0 146.0*       Recent Labs   Lab 11/20/24  0932   PTP 18.8*   INR 1.56*       Recent Labs   Lab 11/20/24  1606 11/20/24  2051 11/21/24  0038   CK 30* 31* 31*         Tele: SR    Diagnostic:  Cath:   Findings:  1. Left main: No stenosis   2. LAD: Prox 30%, mid bifurcation stents are chronically occluded. No flow to the apical LAD  3. LCX: Supplies a medium sized branching OM, luminal irregularities    4. RCA: Dominant to the PDA and large PL system, luminal irregularities    5. SVG to D1 and jump to D2: Widely patent graft. D1 luminal irregularities. D2 luminal irregularities distally, extending to lateral apex. Backfills proximally through the diagonal stent and into the LAD stent, but  does not travel downstream towards the apex.    6. LIMA has 2 major branches, one that likely attaches to the distal LAD and another branch in situ. The distal LAD is diminutive.    7. LV: EF 50%, no RWMA, no sig MR. LVEDP 12 mmHg. No LV-Ao gradient on pullback.   6. Right CFA: No stenosis or plaque. S/p Perclose Proglide.     Conclusions / Recommendations:  Chronic occlusion of mid LAD stent. Patent LIMA to distal LAD, and patent SVG jump graft from D1 to D2.  Preserved LVEF. Likely noncardiac etiology of chest pain. Continue medical therapy.    Assessment:    1. HTN  2. HL  3. CAD  -PCI 2006  4. S/P CABG emergent 2006  -2006 [place], [place]. LIMA to LAD, SVG to D1, SVG to D2  -Cath 2014 EF 35-40%, 100% mid LAD instent restenosis, normal RCA, LIMA not tied to coronaries, patent SVG to D1 and D2 (seq) with retro filling of LAD to the occlusion  -Per prior notes\"Stress test will always be abn in anterior region for him d/t occluded LAD that wasn't bypassed\".                 -Cath 2019 with 100% LAD, mild CAD of LCx and dominant RCA, with patent LIMA and SVG grafts per report.  -Stable 11/24 cath,as above  5. Ischemic Cardiomyopathy  -EF 40-45% in 1/18 and 5/21  -EF 50-55% 9/22  -EF 50-55% with Ant / Ant sept WMA 11/23  6. H/O DVT / PE events (2007,2009,2011, 2104  -Chronic anticoagulation  7. H/O PAF  -?Only post-op  -Exam c/w SR today  8. JOSÉ MANUEL  9. H/O CVA  10. Hypothyroidism      Plan:    ASA   Statin   No BB due to baseline tomas   EF WNL   Discharge planning - home today   F/U 1 month    Isaías Teran MD  11/22/2024  9:52 AM         [1]   Allergies  Allergen Reactions    Demerol [Meperidine Hcl] OTHER (SEE COMMENTS)     \"Loss of psychomotor skills and vomiting\"

## 2024-11-22 NOTE — DISCHARGE INSTRUCTIONS
HOME CARE INSTRUCTIONS FOLLOWING CORONARY ANGIOGRAPHY      Activity  DO NOT drive after the procedure.  You may resume driving late the following day according to the nurse or physician's instructions  Plan on resting and relaxing tonight and tomorrow  Resume your normal activity after 48 hours, or as instructed by your physician  Do not lift anything over 10 pounds for the next 24 hours  Avoid drinking alcohol for the next 24 hours  If the groin site was used, avoid repeated stair use and excessive walking for the next 24 hours  If the wrist was used, avoid bending/flexing of the wrist for the next 24 hours     What is Normal?  A small lump at the procedure site associated with mild tenderness when touched  The procedure site may be bruised or discolored  There may be a small amount of drainage on the bandage     Special Instructions  Drink plenty of fluids during the next 24 hours to \"flush\" the contrast from your system  The bandage is to remain in place for 24 hours  Keep the bandage clean and dry  DO NOT submerge the procedure site for 72 hours (no bath tubs or pools)  This includes dishwashing/submersion of the wrist, if the wrist was used  After 24 hours, you must remove the bandage  You should shower after removing the bandage, and wash the procedure site gently with soap and water  If you choose to wear a bandage for a few days, make sure it remains clean and dry and that it is changed daily     When you should NOTIFY YOUR PHYSICIAN  Bleeding can occur at the procedure site - both on the outside of the skin and/or beneath the surface of the skin  Swelling or a large lump at the procedure site can occur, which may be accompanied by moderate to severe pain     If either of the above occurs, lie down flat.  Have someone apply pressure to the procedure site with both hands, as instructed by the nurse.  Hold pressure for 20 minutes and the bleeding should stop.  Notify your physician of the occurrence  If the  bleeding does not stop, call 911 and continue to apply pressure     If you experience signs of a fever, temperature > 101°, chills, infection (redness, swelling, thick yellow drainage, or a foul odor from the procedure site)  If you notice any numbness, tingling, or loss of feeling to your leg or foot or groin access  If you notice any numbness, tingling, or loss of feeling to your fingers or hand, if wrist access was utilized    Other  You may resume your present diet, unless otherwise specified by your physician.  You may resume all of your medications as prescribed, unless otherwise directed by your physician.  A list of your medications was provided to you at discharge.  Continue the walking program initiated in the hospital and progress your walking as directed.  Or, gradually resume your previous aerobic exercise schedule as tolerated.  Please call your physician’s office for a follow-up appointment.  You should be seen in 2 weeks.

## 2024-11-22 NOTE — PLAN OF CARE
Pt okay to discharge per primary and consults. Patient ambulating and tolerating well. Discharge instructions and education went over with patient & and verbalized understanding. Patient went home. Patient transport by wheelchair.

## 2024-11-22 NOTE — DISCHARGE SUMMARY
General Medicine Discharge Summary     Patient ID:  Michael Crespo  79 year old  6/29/1945    Admit date: 11/20/2024    Discharge date and time: 11/22/2024    Attending Physician: Neal James MD     Primary Care Physician: Jd Lovell MD     Reason for admission: see HPI    Discharge Diagnoses: Acute chest pain [R07.9]    Discharged Condition: good    Exam: (see progress notes for full details)  No acute distress, alert and oriented    Hospital Course: Patient is a 79 year old male with PMH sig for CAD with prior stent/ emergent CABG, HTN, HLD, ischemic CM, PAF, DVT / PE hx on AC, here for chest pain  Has had intermittent chest pain wo any specific triggers, usually occurring about once weekly. Was seem by his cardiologist and plan was to have angiogram this Friday. He presented today to the hosptial due to more frequent pain, last night, relieved with nitroglycein but this am did not respond. No sob, dizziness, no n/v (had nausea with aroud time of pain medications). No f/c. No cough but has noted that he needs to clear his throat.        Impression     -intermittent chest pain  -CAD with prior PCI / CABG  -HTN  -HLD  -ischemic CM   -pAF     -DVT / PE hx on chronic AC  -JOSÉ MANUEL  -obesity  -hypothyroidism     -seizure do     Plan     *CV  -serial trop, has been negative  -tele  -status post angiogram, no intervention at this time, patent grafts okay to discharge home per cardiology  -Cardiology consulted  -aspirin  -statin  -     *Pulm  -on chronic AC for prior VTE, resume anticoagulation on discharge  -cpap      *chronic conditions - home meds as able    Consults: IP CONSULT TO CARDIOLOGY  IP CONSULT TO HOSPITALIST    Operative Procedures:      Disposition: home    Patient Instructions:   Current Discharge Medication List        CONTINUE these medications which have NOT CHANGED    Details   hydroCHLOROthiazide 25 MG Oral Tab Take 1 tablet (25 mg total) by mouth daily.      VITAMIN C OR Take 1 tablet by  mouth daily.      rivaroxaban (XARELTO) 10 MG Oral Tab Take 1 tablet (10 mg total) by mouth. 5/26/23: per Pt to stop 48 hours before procedure- per Dr. Guzman      aspirin 81 MG Oral Chew Tab Chew 1 tablet (81 mg total) by mouth every other day.      levETIRAcetam 500 MG Oral Tab Take 1 tablet (500 mg total) by mouth 2 (two) times daily.      atorvastatin 40 MG Oral Tab Take 1 tablet (40 mg total) by mouth daily.      SERTRALINE 100 MG Oral Tab TAKE TWO TABLETS BY MOUTH ONCE DAILY      ALPRAZolam 0.25 MG Oral Tab Take 1 tablet (0.25 mg total) by mouth 3 (three) times daily as needed.      FINASTERIDE 5 MG Oral Tab Take 1 tablet (5 mg total) by mouth daily.      LEVOTHYROXINE 100 MCG Oral Tab TAKE ONE TABLET BY MOUTH ONCE DAILY      Betamethasone Dipropionate 0.05 % External Lotion Apply to AA of scalp BID x 2-3 weeks, then hold 1 week. Repeat prn      famotidine 40 MG Oral Tab Take 1 tablet (40 mg total) by mouth daily.           STOP taking these medications       nitroglycerin 0.4 MG Sublingual SL Tab        KETOCONAZOLE 1 % External Shampoo        tobramycin 0.3 % Ophthalmic Solution        potassium chloride 20 MEQ Oral Tab CR                I reconciled current and discharge medications on day of discharge    Follow-up with PCP, cardiology    No orders of the defined types were placed in this encounter.          Total Time Coordinating Care: Greater than 30 minutes    Patient had opportunity to ask questions and state understand and agree with therapeutic plan as outlined above.     Thank You,    Gale Sommers MD

## 2024-11-22 NOTE — PLAN OF CARE
Received patient at 0730. Patient alert and oriented x4. Tele Rhythm NSR w/ 1st HB. O2 sats on RA.  Lungs clear. Bed is locked and low position. Call light & personal belongings within reach. Denies chest pain at this time. Patient voiding WNL. Patient tolerating ambulation well. Skin dry and intact. Reviewed plan of care with patient and verbalized understanding.     POC:  Right groin LHC clean, dry , intact, soft , no hematoma  DC planning    Problem: CARDIOVASCULAR - ADULT  Goal: Maintains optimal cardiac output and hemodynamic stability  Description: INTERVENTIONS:  - Monitor vital signs, rhythm, and trends  - Monitor for bleeding, hypotension and signs of decreased cardiac output  - Evaluate effectiveness of vasoactive medications to optimize hemodynamic stability  - Monitor arterial and/or venous puncture sites for bleeding and/or hematoma  - Assess quality of pulses, skin color and temperature  - Assess for signs of decreased coronary artery perfusion - ex. Angina  - Evaluate fluid balance, assess for edema, trend weights  Outcome: Progressing  Goal: Absence of cardiac arrhythmias or at baseline  Description: INTERVENTIONS:  - Continuous cardiac monitoring, monitor vital signs, obtain 12 lead EKG if indicated  - Evaluate effectiveness of antiarrhythmic and heart rate control medications as ordered  - Initiate emergency measures for life threatening arrhythmias  - Monitor electrolytes and administer replacement therapy as ordered  Outcome: Progressing

## 2024-11-22 NOTE — PLAN OF CARE
Assumed care of patient at 1930. Aox4. RA/2L at night for JOSÉ MANUEL. SB w/1st deg AVB. Continent of bowel and bladder. R-groin access incision ([facility] 11/21). 1x assist w/walker, difficulty moving due to back pain. Pt reports severe back spasms/pain with movement, MD notified, order for 1x 5mg Flexril. Bed locked and in lowest position. Call light and personal items within reach.      Possible DC 11/21      Problem: CARDIOVASCULAR - ADULT  Goal: Maintains optimal cardiac output and hemodynamic stability  Description: INTERVENTIONS:  - Monitor vital signs, rhythm, and trends  - Monitor for bleeding, hypotension and signs of decreased cardiac output  - Evaluate effectiveness of vasoactive medications to optimize hemodynamic stability  - Monitor arterial and/or venous puncture sites for bleeding and/or hematoma  - Assess quality of pulses, skin color and temperature  - Assess for signs of decreased coronary artery perfusion - ex. Angina  - Evaluate fluid balance, assess for edema, trend weights  Outcome: Progressing  Goal: Absence of cardiac arrhythmias or at baseline  Description: INTERVENTIONS:  - Continuous cardiac monitoring, monitor vital signs, obtain 12 lead EKG if indicated  - Evaluate effectiveness of antiarrhythmic and heart rate control medications as ordered  - Initiate emergency measures for life threatening arrhythmias  - Monitor electrolytes and administer replacement therapy as ordered  Outcome: Progressing     Problem: PAIN - ADULT  Goal: Verbalizes/displays adequate comfort level or patient's stated pain goal  Description: INTERVENTIONS:  - Encourage pt to monitor pain and request assistance  - Assess pain using appropriate pain scale  - Administer analgesics based on type and severity of pain and evaluate response  - Implement non-pharmacological measures as appropriate and evaluate response  - Consider cultural and social influences on pain and pain management  - Manage/alleviate anxiety  - Utilize  distraction and/or relaxation techniques  - Monitor for opioid side effects  - Notify MD/LIP if interventions unsuccessful or patient reports new pain  - Anticipate increased pain with activity and pre-medicate as appropriate  Outcome: Progressing

## (undated) DEVICE — FORCEP RADIAL JAW 4

## (undated) NOTE — LETTER
Date & Time: 4/25/2023, 2:55 PM  Patient: Lani Plascencia  Attending Provider: Dallas Denson,       This certifies that Isela Arias, a patient at an [facility] facility, am leaving the facility voluntarily and against the advice of my physician. I acknowledge that I have been:    1. informed that my physician believes that I need to receive care here;  2. informed that if I leave, I could become sicker or even die;  3. provided instructions consistent with my current diagnosis; and  4. informed that my physician recommends EMS/911 transportation to the hospital for further           evaluation. I hereby release my physician, the facility, and its employees from all responsibility for any ill effects which may result from this action. __________________________________  Patient or authorized caregiver signature    __________________________________  RN signature    If no patient or patient representative signature was obtained, sign below to acknowledge that the form was reviewed with the patient and that the patient refused to sign.     __________________________________  RN signature

## (undated) NOTE — LETTER
[facility] [address], [address]  Consent for Procedure/Sedation    Date: 1/08/2018    Time: 0835      1.  I authorize the performance upon Dede Leal the following:cardiac catheterization, left ventricular cineangiography period, the physician will determine when the applicable recovery period ends for purposes of reinstating the Do Not Resuscitate (DNR) order.     Signature of Patient: ____________________________________________________    Signature of person authorized

## (undated) NOTE — Clinical Note
[address]  [address]  Authorization for Surgical Operation and Procedure     Date:___________                                                                                                         Time:__________  1. I hereby authorize * Surgery not found *, my physician and his/her assistants (if applicable), which may include medical students, residents, and/or fellows, to perform the following surgical operation/ procedure and administer such anesthesia as may be determined necessary by my physician:  Operation/Procedure name (s)  on Michael Crespo   2.   I recognize that during the surgical operation/procedure, unforeseen conditions may necessitate additional or different procedures than those listed above.  I, therefore, further authorize and request that the above-named surgeon, assistants, or designees perform such procedures as are, in their judgment, necessary and desirable.    3.   My surgeon/physician has discussed prior to my surgery the potential benefits, risks and side effects of this procedure; the likelihood of achieving goals; and potential problems that might occur during recuperation.  They also discussed reasonable alternatives to the procedure, including risks, benefits, and side effects related to the alternatives and risks related to not receiving this procedure.  I have had all my questions answered and I acknowledge that no guarantee has been made as to the result that may be obtained.    4.   Should the need arise during my operation/procedure, which includes change of level of care prior to discharge, I also consent to the administration of blood and/or blood products.  Further, I understand that despite careful testing and screening of blood or blood products by collecting agencies, I may still be subject to ill effects as a result of receiving a blood transfusion and/or blood products.  The following are some, but not all, of the potential  risks that can occur: fever and allergic reactions, hemolytic reactions, transmission of diseases such as Hepatitis, AIDS and Cytomegalovirus (CMV) and fluid overload.  In the event that I wish to have an autologous transfusion of my own blood, or a directed donor transfusion, I will discuss this with my physician.  Check only if Refusing Blood or Blood Products  I understand refusal of blood or blood products as deemed necessary by my physician may have serious consequences to my condition to include possible death. I hereby assume responsibility for my refusal and release the hospital, its personnel, and my physicians from any responsibility for the consequences of my refusal.          o  Refuse      5.   I authorize the use of any specimen, organs, tissues, body parts or foreign objects that may be removed from my body during the operation/procedure for diagnosis, research or teaching purposes and their subsequent disposal by hospital authorities.  I also authorize the release of specimen test results and/or written reports to my treating physician on the hospital medical staff or other referring or consulting physicians involved in my care, at the discretion of the Pathologist or my treating physician.    6.   I consent to the photographing or videotaping of the operations or procedures to be performed, including appropriate portions of my body for medical, scientific, or educational purposes, provided my identity is not revealed by the pictures or by descriptive texts accompanying them.  If the procedure has been photographed/videotaped, the surgeon will obtain the original picture, image, videotape or CD.  The hospital will not be responsible for storage, release or maintenance of the picture, image, tape or CD.    7.   I consent to the presence of a  or observers in the operating room as deemed necessary by my physician or their designees.    8.   I recognize that in the event my procedure  results in extended X-Ray/fluoroscopy time, I may develop a skin reaction.    9. If I have a Do Not Attempt Resuscitation (DNAR) order in place, that status will be suspended while in the operating room, procedural suite, and during the recovery period unless otherwise explicitly stated by me (or a person authorized to consent on my behalf). The surgeon or my attending physician will determine when the applicable recovery period ends for purposes of reinstating the DNAR order.  10. Patients having a sterilization procedure: I understand that if the procedure is successful the results will be permanent and it will therefore be impossible for me to inseminate, conceive, or bear children.  I also understand that the procedure is intended to result in sterility, although the result has not been guaranteed.   11. I acknowledge that my physician has explained sedation/analgesia administration to me including the risk and benefits I consent to the administration of sedation/analgesia as may be necessary or desirable in the judgment of my physician.    I CERTIFY THAT I HAVE READ AND FULLY UNDERSTAND THE ABOVE CONSENT TO OPERATION and/or OTHER PROCEDURE.    _________________________________________  __________________________________  Signature of Patient     Signature of Responsible Person         ___________________________________         Printed Name of Responsible Person           _________________________________                 Relationship to Patient  _________________________________________  ______________________________  Signature of Witness          Date  Time      Patient Name: Michael Crespo     : 1945                 Printed: 2024     Medical Record #: [id number]                     Page 1 of [address] [place] [address]  [id number]    Consent for Anesthesia    1. I, Michael Crespo agree to be cared for by an anesthesiologist,  who is specially trained to monitor me and give me medicine to put me to sleep or keep me comfortable during my procedure    I understand that my anesthesiologist is not an employee or agent of [facility] or [employer]. He or she works for [employer].    2. As the patient asking for anesthesia services, I agree to:  a. Allow the anesthesiologist (anesthesia doctor) to give me medicine and do additional procedures as necessary. Some examples are: Starting or using an “IV” to give me medicine, fluids or blood during my procedure, and having a breathing tube placed to help me breathe when I’m asleep (intubation). In the event that my heart stops working properly, I understand that my anesthesiologist will make every effort to sustain my life, unless otherwise directed by [facility] Do Not Resuscitate documents.  b. Tell my anesthesia doctor before my procedure:  i. If I am pregnant.  ii. The last time that I ate or drank.  iii. All of the medicines I take (including prescriptions, herbal supplements, and pills I can buy without a prescription (including street drugs/illegal medications). Failure to inform my anesthesiologist about these medicines may increase my risk of anesthetic complications.  iv. If I am allergic to anything or have had a reaction to anesthesia before.  3. I understand how the anesthesia medicine will help me (benefits).  4. I understand that with any type of anesthesia medicine there are risks:  a. The most common risks are: nausea, vomiting, sore throat, muscle soreness, damage to my eyes, mouth, or teeth (from breathing tube placement).  b. Rare risks include: remembering what happened during my procedure, allergic reactions to medications, injury to my airway, heart, lungs, vision, nerves, or muscles and in extremely rare instances death.  5. My doctor has explained to me other choices available to me for my care (alternatives).  6. Pregnant Patients  (“epidural”):  I understand that the risks of having an epidural (medicine given into my back to help control pain during labor), include itching, low blood pressure, difficulty urinating, headache or slowing of the baby’s heart. Very rare risks include infection, bleeding, seizure, irregular heart rhythms and nerve injury.  7. Regional Anesthesia (“spinal”, “epidural”, & “nerve blocks”):  I understand that rare but potential complications include headache, bleeding, infection, seizure, irregular heart rhythms, and nerve injury.    I can change my mind about having anesthesia services at any time before I get the medicine.    _____________________________________________________________________________  Patient (or Representative) Signature/Relationship to Patient  Date   Time    _____________________________________________________________________________   Name (if used)    Language/Organization   Time    _____________________________________________________________________________  Anesthesiologist Signature     Date   Time  I have discussed the procedure and information above with the patient (or patient’s representative) and answered their questions. The patient or their representative has agreed to have anesthesia services.    _____________________________________________________________________________  Witness        Date   Time  I have verified that the signature is that of the patient or patient’s representative, and that it was signed before the procedure  Patient Name: Michael Navarretere     : 1945                 Printed: 2024     Medical Record #: [id number]                     Page 2 of 2

## (undated) NOTE — LETTER
10/15/20    Patient: Irene Olvera  : 1945 Visit date: 10/15/2020    Dear  Carl Harris MD    Thank you for referring Brandonisaias May to my practice. Please find my assessment and plan below.        Assessment   FH: colon cancer  (primar Clinical examination of the abdomen reveals it to be soft, nondistended, nontender, bowel sounds are normal activity normal pitch. There  is no rebounding tenderness or guarding. There are no signs of ascites or peritonitis.   The liver and spleen are non

## (undated) NOTE — LETTER
[address]  [address]  Consent for Procedure/Sedation  Date: 11/20/2024         Time: 1700    I hereby authorize Dr. Teran, my physician and his/her assistants (if applicable), which may include medical students, residents, and/or fellows, to perform the following surgical operation/ procedure and administer such anesthesia as may be determined necessary by my physician: [place]. Teran [place] [facility] [place]  2.   I recognize that during the surgical operation/procedure, unforeseen conditions may necessitate additional or different procedures than those listed above.  I, therefore, further authorize and request that the above-named surgeon, assistants, or designees perform such procedures as are, in their judgment, necessary and desirable.    3.   My surgeon/physician has discussed prior to my surgery the potential benefits, risks and side effects of this procedure; the likelihood of achieving goals; and potential problems that might occur during recuperation.  They also discussed reasonable alternatives to the procedure, including risks, benefits, and side effects related to the alternatives and risks related to not receiving this procedure.  I have had all my questions answered and I acknowledge that no guarantee has been made as to the result that may be obtained.    4.   Should the need arise during my operation/procedure, which includes change of level of care prior to discharge, I also consent to the administration of blood and/or blood products.  Further, I understand that despite careful testing and screening of blood or blood products by collecting agencies, I may still be subject to ill effects as a result of receiving a blood transfusion and/or blood products.  The following are some, but not all, of the potential risks that can occur: fever and allergic reactions, hemolytic reactions, transmission of diseases such as Hepatitis, AIDS and Cytomegalovirus (CMV) and fluid  overload.  In the event that I wish to have an autologous transfusion of my own blood, or a directed donor transfusion, I will discuss this with my physician.   Check only if Refusing Blood or Blood Products  I understand refusal of blood or blood products as deemed necessary by my physician may have serious consequences to my condition to include possible death. I hereby assume responsibility for my refusal and release the hospital, its personnel, and my physicians from any responsibility for the consequences of my refusal.         o  Refuse         5.   I authorize the use of any specimen, organs, tissues, body parts or foreign objects that may be removed from my body during the operation/procedure for diagnosis, research or teaching purposes and their subsequent disposal by hospital authorities.  I also authorize the release of specimen test results and/or written reports to my treating physician on the hospital medical staff or other referring or consulting physicians involved in my care, at the discretion of the Pathologist or my treating physician.    6.   I consent to the photographing or videotaping of the operations or procedures to be performed, including appropriate portions of my body for medical, scientific, or educational purposes, provided my identity is not revealed by the pictures or by descriptive texts accompanying them.  If the procedure has been photographed/videotaped, the surgeon will obtain the original picture, image, videotape or CD.  The hospital will not be responsible for storage, release or maintenance of the picture, image, tape or CD.    7.   I consent to the presence of a  or observers in the operating room as deemed necessary by my physician or their designees.    8.   I recognize that in the event my procedure results in extended X-Ray/fluoroscopy time, I may develop a skin reaction.    9. If I have a Do Not Attempt Resuscitation (DNAR) order in place, that status  will be suspended while in the operating room, procedural suite, and during the recovery period unless otherwise explicitly stated by me (or a person authorized to consent on my behalf). The surgeon or my attending physician will determine when the applicable recovery period ends for purposes of reinstating the DNAR order.  10. Patients having a sterilization procedure: I understand that if the procedure is successful the results will be permanent and it will therefore be impossible for me to inseminate, conceive, or bear children.  I also understand that the procedure is intended to result in sterility, although the result has not been guaranteed.   11. I acknowledge that my physician has explained sedation/analgesia administration to me including the risk and benefits I consent to the administration of sedation/analgesia as may be necessary or desirable in the judgment of my physician.    I CERTIFY THAT I HAVE READ AND FULLY UNDERSTAND THE ABOVE CONSENT TO OPERATION and/or OTHER PROCEDURE.        ____________________________________       _________________________________      ______________________________  Signature of Patient         Signature of Responsible Person        Printed Name of Responsible Person        ____________________________________      _________________________________      ______________________________       Signature of Witness          Relationship to Patient                       Date                                       Time  Patient Name: Michael FERNANDEZ Cherie  : 1945    Reviewed: 2024   Printed: 2024  Medical Record #: [id number] Page 1 of 1

## (undated) NOTE — LETTER
[address]  [address]  Consent for Procedure/Sedation  Date: 11/21/2024         Time: 0500    I hereby authorize Dr. Teran, my physician and his/her assistants (if applicable), which may include medical students, residents, and/or fellows, to perform the following surgical operation/ procedure and administer such anesthesia as may be determined necessary by my physician:  on Michael FERNANDEZ [facility]  2.   I recognize that during the surgical operation/procedure, unforeseen conditions may necessitate additional or different procedures than those listed above.  I, therefore, further authorize and request that the above-named surgeon, assistants, or designees perform such procedures as are, in their judgment, necessary and desirable.    3.   My surgeon/physician has discussed prior to my surgery the potential benefits, risks and side effects of this procedure; the likelihood of achieving goals; and potential problems that might occur during recuperation.  They also discussed reasonable alternatives to the procedure, including risks, benefits, and side effects related to the alternatives and risks related to not receiving this procedure.  I have had all my questions answered and I acknowledge that no guarantee has been made as to the result that may be obtained.    4.   Should the need arise during my operation/procedure, which includes change of level of care prior to discharge, I also consent to the administration of blood and/or blood products.  Further, I understand that despite careful testing and screening of blood or blood products by collecting agencies, I may still be subject to ill effects as a result of receiving a blood transfusion and/or blood products.  The following are some, but not all, of the potential risks that can occur: fever and allergic reactions, hemolytic reactions, transmission of diseases such as Hepatitis, AIDS and Cytomegalovirus (CMV) and fluid  overload.  In the event that I wish to have an autologous transfusion of my own blood, or a directed donor transfusion, I will discuss this with my physician.   Check only if Refusing Blood or Blood Products  I understand refusal of blood or blood products as deemed necessary by my physician may have serious consequences to my condition to include possible death. I hereby assume responsibility for my refusal and release the hospital, its personnel, and my physicians from any responsibility for the consequences of my refusal.         o  Refuse         5.   I authorize the use of any specimen, organs, tissues, body parts or foreign objects that may be removed from my body during the operation/procedure for diagnosis, research or teaching purposes and their subsequent disposal by hospital authorities.  I also authorize the release of specimen test results and/or written reports to my treating physician on the hospital medical staff or other referring or consulting physicians involved in my care, at the discretion of the Pathologist or my treating physician.    6.   I consent to the photographing or videotaping of the operations or procedures to be performed, including appropriate portions of my body for medical, scientific, or educational purposes, provided my identity is not revealed by the pictures or by descriptive texts accompanying them.  If the procedure has been photographed/videotaped, the surgeon will obtain the original picture, image, videotape or CD.  The hospital will not be responsible for storage, release or maintenance of the picture, image, tape or CD.    7.   I consent to the presence of a  or observers in the operating room as deemed necessary by my physician or their designees.    8.   I recognize that in the event my procedure results in extended X-Ray/fluoroscopy time, I may develop a skin reaction.    9. If I have a Do Not Attempt Resuscitation (DNAR) order in place, that status  will be suspended while in the operating room, procedural suite, and during the recovery period unless otherwise explicitly stated by me (or a person authorized to consent on my behalf). The surgeon or my attending physician will determine when the applicable recovery period ends for purposes of reinstating the DNAR order.  10. Patients having a sterilization procedure: I understand that if the procedure is successful the results will be permanent and it will therefore be impossible for me to inseminate, conceive, or bear children.  I also understand that the procedure is intended to result in sterility, although the result has not been guaranteed.   11. I acknowledge that my physician has explained sedation/analgesia administration to me including the risk and benefits I consent to the administration of sedation/analgesia as may be necessary or desirable in the judgment of my physician.    I CERTIFY THAT I HAVE READ AND FULLY UNDERSTAND THE ABOVE CONSENT TO OPERATION and/or OTHER PROCEDURE.        ____________________________________       _________________________________      ______________________________  Signature of Patient         Signature of Responsible Person        Printed Name of Responsible Person        ____________________________________      _________________________________      ______________________________       Signature of Witness          Relationship to Patient                       Date                                       Time  Patient Name: Michael FERNANDEZ Cherie  : 1945    Reviewed: 2024   Printed: 2024  Medical Record #: [id number] Page 1 of 1

## (undated) NOTE — Clinical Note
[address]  [address]  Authorization for Surgical Operation and Procedure     Date:___________                                                                                                         Time:__________  1. I hereby authorize * Surgery not found *, my physician and his/her assistants (if applicable), which may include medical students, residents, and/or fellows, to perform the following surgical operation/ procedure and administer such anesthesia as may be determined necessary by my physician:  Operation/Procedure name (s)  on Michael Crespo   2.   I recognize that during the surgical operation/procedure, unforeseen conditions may necessitate additional or different procedures than those listed above.  I, therefore, further authorize and request that the above-named surgeon, assistants, or designees perform such procedures as are, in their judgment, necessary and desirable.    3.   My surgeon/physician has discussed prior to my surgery the potential benefits, risks and side effects of this procedure; the likelihood of achieving goals; and potential problems that might occur during recuperation.  They also discussed reasonable alternatives to the procedure, including risks, benefits, and side effects related to the alternatives and risks related to not receiving this procedure.  I have had all my questions answered and I acknowledge that no guarantee has been made as to the result that may be obtained.    4.   Should the need arise during my operation/procedure, which includes change of level of care prior to discharge, I also consent to the administration of blood and/or blood products.  Further, I understand that despite careful testing and screening of blood or blood products by collecting agencies, I may still be subject to ill effects as a result of receiving a blood transfusion and/or blood products.  The following are some, but not all, of the potential  risks that can occur: fever and allergic reactions, hemolytic reactions, transmission of diseases such as Hepatitis, AIDS and Cytomegalovirus (CMV) and fluid overload.  In the event that I wish to have an autologous transfusion of my own blood, or a directed donor transfusion, I will discuss this with my physician.  Check only if Refusing Blood or Blood Products  I understand refusal of blood or blood products as deemed necessary by my physician may have serious consequences to my condition to include possible death. I hereby assume responsibility for my refusal and release the hospital, its personnel, and my physicians from any responsibility for the consequences of my refusal.          o  Refuse      5.   I authorize the use of any specimen, organs, tissues, body parts or foreign objects that may be removed from my body during the operation/procedure for diagnosis, research or teaching purposes and their subsequent disposal by hospital authorities.  I also authorize the release of specimen test results and/or written reports to my treating physician on the hospital medical staff or other referring or consulting physicians involved in my care, at the discretion of the Pathologist or my treating physician.    6.   I consent to the photographing or videotaping of the operations or procedures to be performed, including appropriate portions of my body for medical, scientific, or educational purposes, provided my identity is not revealed by the pictures or by descriptive texts accompanying them.  If the procedure has been photographed/videotaped, the surgeon will obtain the original picture, image, videotape or CD.  The hospital will not be responsible for storage, release or maintenance of the picture, image, tape or CD.    7.   I consent to the presence of a  or observers in the operating room as deemed necessary by my physician or their designees.    8.   I recognize that in the event my procedure  results in extended X-Ray/fluoroscopy time, I may develop a skin reaction.    9. If I have a Do Not Attempt Resuscitation (DNAR) order in place, that status will be suspended while in the operating room, procedural suite, and during the recovery period unless otherwise explicitly stated by me (or a person authorized to consent on my behalf). The surgeon or my attending physician will determine when the applicable recovery period ends for purposes of reinstating the DNAR order.  10. Patients having a sterilization procedure: I understand that if the procedure is successful the results will be permanent and it will therefore be impossible for me to inseminate, conceive, or bear children.  I also understand that the procedure is intended to result in sterility, although the result has not been guaranteed.   11. I acknowledge that my physician has explained sedation/analgesia administration to me including the risk and benefits I consent to the administration of sedation/analgesia as may be necessary or desirable in the judgment of my physician.    I CERTIFY THAT I HAVE READ AND FULLY UNDERSTAND THE ABOVE CONSENT TO OPERATION and/or OTHER PROCEDURE.    _________________________________________  __________________________________  Signature of Patient     Signature of Responsible Person         ___________________________________         Printed Name of Responsible Person           _________________________________                 Relationship to Patient  _________________________________________  ______________________________  Signature of Witness          Date  Time      Patient Name: Michael Crespo     : 1945                 Printed: 2024     Medical Record #: [id number]                     Page 1 of [address] [place] [address]  [id number]    Consent for Anesthesia    1. I, Michael Crespo agree to be cared for by an anesthesiologist,  who is specially trained to monitor me and give me medicine to put me to sleep or keep me comfortable during my procedure    I understand that my anesthesiologist is not an employee or agent of [facility] or [employer]. He or she works for [employer].    2. As the patient asking for anesthesia services, I agree to:  a. Allow the anesthesiologist (anesthesia doctor) to give me medicine and do additional procedures as necessary. Some examples are: Starting or using an “IV” to give me medicine, fluids or blood during my procedure, and having a breathing tube placed to help me breathe when I’m asleep (intubation). In the event that my heart stops working properly, I understand that my anesthesiologist will make every effort to sustain my life, unless otherwise directed by [facility] Do Not Resuscitate documents.  b. Tell my anesthesia doctor before my procedure:  i. If I am pregnant.  ii. The last time that I ate or drank.  iii. All of the medicines I take (including prescriptions, herbal supplements, and pills I can buy without a prescription (including street drugs/illegal medications). Failure to inform my anesthesiologist about these medicines may increase my risk of anesthetic complications.  iv. If I am allergic to anything or have had a reaction to anesthesia before.  3. I understand how the anesthesia medicine will help me (benefits).  4. I understand that with any type of anesthesia medicine there are risks:  a. The most common risks are: nausea, vomiting, sore throat, muscle soreness, damage to my eyes, mouth, or teeth (from breathing tube placement).  b. Rare risks include: remembering what happened during my procedure, allergic reactions to medications, injury to my airway, heart, lungs, vision, nerves, or muscles and in extremely rare instances death.  5. My doctor has explained to me other choices available to me for my care (alternatives).  6. Pregnant Patients  (“epidural”):  I understand that the risks of having an epidural (medicine given into my back to help control pain during labor), include itching, low blood pressure, difficulty urinating, headache or slowing of the baby’s heart. Very rare risks include infection, bleeding, seizure, irregular heart rhythms and nerve injury.  7. Regional Anesthesia (“spinal”, “epidural”, & “nerve blocks”):  I understand that rare but potential complications include headache, bleeding, infection, seizure, irregular heart rhythms, and nerve injury.    I can change my mind about having anesthesia services at any time before I get the medicine.    _____________________________________________________________________________  Patient (or Representative) Signature/Relationship to Patient  Date   Time    _____________________________________________________________________________   Name (if used)    Language/Organization   Time    _____________________________________________________________________________  Anesthesiologist Signature     Date   Time  I have discussed the procedure and information above with the patient (or patient’s representative) and answered their questions. The patient or their representative has agreed to have anesthesia services.    _____________________________________________________________________________  Witness        Date   Time  I have verified that the signature is that of the patient or patient’s representative, and that it was signed before the procedure  Patient Name: Michael Navarretere     : 1945                 Printed: 2024     Medical Record #: [id number]                     Page 2 of 2

## (undated) NOTE — LETTER
[address]  [address]  Consent for Procedure/Sedation  Date: 11/21/24           Time: 0500      I hereby authorize , my physician and his/her assistants (if applicable), which may include medical students, residents, and/or fellows, to perform the following surgical operation/ procedure and administer such anesthesia as may be determined necessary by my physician:  on [facility]  2.   I recognize that during the surgical operation/procedure, unforeseen conditions may necessitate additional or different procedures than those listed above.  I, therefore, further authorize and request that the above-named surgeon, assistants, or designees perform such procedures as are, in their judgment, necessary and desirable.    3.   My surgeon/physician has discussed prior to my surgery the potential benefits, risks and side effects of this procedure; the likelihood of achieving goals; and potential problems that might occur during recuperation.  They also discussed reasonable alternatives to the procedure, including risks, benefits, and side effects related to the alternatives and risks related to not receiving this procedure.  I have had all my questions answered and I acknowledge that no guarantee has been made as to the result that may be obtained.    4.   Should the need arise during my operation/procedure, which includes change of level of care prior to discharge, I also consent to the administration of blood and/or blood products.  Further, I understand that despite careful testing and screening of blood or blood products by collecting agencies, I may still be subject to ill effects as a result of receiving a blood transfusion and/or blood products.  The following are some, but not all, of the potential risks that can occur: fever and allergic reactions, hemolytic reactions, transmission of diseases such as Hepatitis, AIDS and Cytomegalovirus (CMV) and fluid  overload.  In the event that I wish to have an autologous transfusion of my own blood, or a directed donor transfusion, I will discuss this with my physician.   Check only if Refusing Blood or Blood Products  I understand refusal of blood or blood products as deemed necessary by my physician may have serious consequences to my condition to include possible death. I hereby assume responsibility for my refusal and release the hospital, its personnel, and my physicians from any responsibility for the consequences of my refusal.         o  Refuse         5.   I authorize the use of any specimen, organs, tissues, body parts or foreign objects that may be removed from my body during the operation/procedure for diagnosis, research or teaching purposes and their subsequent disposal by hospital authorities.  I also authorize the release of specimen test results and/or written reports to my treating physician on the hospital medical staff or other referring or consulting physicians involved in my care, at the discretion of the Pathologist or my treating physician.    6.   I consent to the photographing or videotaping of the operations or procedures to be performed, including appropriate portions of my body for medical, scientific, or educational purposes, provided my identity is not revealed by the pictures or by descriptive texts accompanying them.  If the procedure has been photographed/videotaped, the surgeon will obtain the original picture, image, videotape or CD.  The hospital will not be responsible for storage, release or maintenance of the picture, image, tape or CD.    7.   I consent to the presence of a  or observers in the operating room as deemed necessary by my physician or their designees.    8.   I recognize that in the event my procedure results in extended X-Ray/fluoroscopy time, I may develop a skin reaction.    9. If I have a Do Not Attempt Resuscitation (DNAR) order in place, that status  will be suspended while in the operating room, procedural suite, and during the recovery period unless otherwise explicitly stated by me (or a person authorized to consent on my behalf). The surgeon or my attending physician will determine when the applicable recovery period ends for purposes of reinstating the DNAR order.  10. Patients having a sterilization procedure: I understand that if the procedure is successful the results will be permanent and it will therefore be impossible for me to inseminate, conceive, or bear children.  I also understand that the procedure is intended to result in sterility, although the result has not been guaranteed.   11. I acknowledge that my physician has explained sedation/analgesia administration to me including the risk and benefits I consent to the administration of sedation/analgesia as may be necessary or desirable in the judgment of my physician.    I CERTIFY THAT I HAVE READ AND FULLY UNDERSTAND THE ABOVE CONSENT TO OPERATION and/or OTHER PROCEDURE.        ____________________________________       _________________________________      ______________________________  Signature of Patient         Signature of Responsible Person        Printed Name of Responsible Person        ____________________________________      _________________________________      ______________________________       Signature of Witness          Relationship to Patient                       Date                                       Time  Patient Name: Michael FERNANDEZ Cherie  : 1945    Reviewed: 2024   Printed: 2024  Medical Record #: [id number] Page 1 of 1